# Patient Record
Sex: FEMALE | Race: BLACK OR AFRICAN AMERICAN | NOT HISPANIC OR LATINO | Employment: OTHER | URBAN - METROPOLITAN AREA
[De-identification: names, ages, dates, MRNs, and addresses within clinical notes are randomized per-mention and may not be internally consistent; named-entity substitution may affect disease eponyms.]

---

## 2017-03-09 PROBLEM — R93.89 ABNORMAL CHEST X-RAY: Status: ACTIVE | Noted: 2017-03-09

## 2017-03-09 PROBLEM — I20.89 STABLE ANGINA: Status: ACTIVE | Noted: 2017-03-09

## 2017-03-09 PROBLEM — I50.30 NYHA CLASS 3 HEART FAILURE WITH PRESERVED EJECTION FRACTION: Status: ACTIVE | Noted: 2017-03-09

## 2017-04-07 PROBLEM — R41.3 MEMORY LOSS: Chronic | Status: ACTIVE | Noted: 2017-04-07

## 2017-09-11 PROBLEM — I07.1 MILD TRICUSPID REGURGITATION: Status: ACTIVE | Noted: 2017-09-11

## 2017-09-11 PROBLEM — R05.8 ACE-INHIBITOR COUGH: Status: ACTIVE | Noted: 2017-09-11

## 2017-09-11 PROBLEM — Z87.891 HX OF TOBACCO USE, PRESENTING HAZARDS TO HEALTH: Status: ACTIVE | Noted: 2017-09-11

## 2017-09-11 PROBLEM — I50.30 NYHA CLASS 3 HEART FAILURE WITH PRESERVED EJECTION FRACTION: Status: RESOLVED | Noted: 2017-03-09 | Resolved: 2017-09-11

## 2017-09-11 PROBLEM — I51.7 MILD PULMONIC REGURGITATION AND RIGHT VENTRICULAR DILATION BY PRIOR ECHOCARDIOGRAM: Status: ACTIVE | Noted: 2017-09-11

## 2017-09-11 PROBLEM — I37.1 MILD PULMONIC REGURGITATION AND RIGHT VENTRICULAR DILATION BY PRIOR ECHOCARDIOGRAM: Status: ACTIVE | Noted: 2017-09-11

## 2017-09-11 PROBLEM — T46.4X5A ACE-INHIBITOR COUGH: Status: ACTIVE | Noted: 2017-09-11

## 2017-10-16 PROBLEM — J41.1 BRONCHITIS, MUCOPURULENT RECURRENT: Status: ACTIVE | Noted: 2017-10-16

## 2017-10-16 PROBLEM — Z98.890 HISTORY OF BRONCHOSCOPY: Status: ACTIVE | Noted: 2017-10-16

## 2017-10-16 PROBLEM — R05.3 PERSISTENT COUGH: Status: ACTIVE | Noted: 2017-10-16

## 2018-04-04 PROBLEM — R05.3 CHRONIC COUGH: Status: ACTIVE | Noted: 2018-04-04

## 2018-05-09 PROBLEM — Z86.010 HISTORY OF COLON POLYPS: Status: ACTIVE | Noted: 2018-05-09

## 2018-05-09 PROBLEM — R12 HEARTBURN: Status: ACTIVE | Noted: 2018-05-09

## 2018-05-09 PROBLEM — Z80.0 FAMILY HISTORY OF COLON CANCER: Status: ACTIVE | Noted: 2018-05-09

## 2018-05-14 PROBLEM — K02.9 DENTAL DECAY: Status: ACTIVE | Noted: 2018-05-14

## 2018-05-14 PROBLEM — Z63.8 CAREGIVER ROLE STRAIN: Status: ACTIVE | Noted: 2018-05-14

## 2018-07-23 PROBLEM — I20.89 STABLE ANGINA: Chronic | Status: ACTIVE | Noted: 2017-03-09

## 2018-07-23 PROBLEM — R41.3 MEMORY LOSS: Status: ACTIVE | Noted: 2017-04-07

## 2018-07-23 PROBLEM — Z63.8 CAREGIVER ROLE STRAIN: Chronic | Status: ACTIVE | Noted: 2018-05-14

## 2018-09-03 PROBLEM — J47.9 BRONCHIECTASIS: Status: ACTIVE | Noted: 2018-09-03

## 2018-10-03 PROBLEM — R39.15 URGENCY OF URINATION: Status: ACTIVE | Noted: 2018-10-03

## 2018-10-03 PROBLEM — N39.0 RECURRENT UTI: Status: ACTIVE | Noted: 2018-10-03

## 2018-10-03 PROBLEM — R35.0 URINARY FREQUENCY: Status: ACTIVE | Noted: 2018-10-03

## 2018-12-14 PROBLEM — J18.9 PNEUMONIA OF LEFT LOWER LOBE DUE TO INFECTIOUS ORGANISM: Status: ACTIVE | Noted: 2018-12-14

## 2019-05-30 PROBLEM — J44.1 COPD EXACERBATION: Status: ACTIVE | Noted: 2019-05-30

## 2019-06-08 PROBLEM — J44.9 COPD (CHRONIC OBSTRUCTIVE PULMONARY DISEASE): Status: ACTIVE | Noted: 2019-01-01

## 2019-06-08 PROBLEM — R07.89 LEFT-SIDED CHEST WALL PAIN: Status: ACTIVE | Noted: 2019-01-01

## 2019-06-08 PROBLEM — R79.89 ELEVATED TROPONIN: Status: ACTIVE | Noted: 2019-01-01

## 2019-07-25 PROBLEM — M25.512 SHOULDER PAIN, LEFT: Status: ACTIVE | Noted: 2019-01-01

## 2019-10-24 PROBLEM — I71.40 ABDOMINAL AORTIC ANEURYSM (AAA) WITHOUT RUPTURE: Status: ACTIVE | Noted: 2019-01-01

## 2019-11-13 PROBLEM — R79.89 ELEVATED TROPONIN: Status: RESOLVED | Noted: 2019-01-01 | Resolved: 2019-01-01

## 2019-11-13 PROBLEM — N18.31 CHRONIC KIDNEY DISEASE, STAGE 3A: Status: ACTIVE | Noted: 2019-01-01

## 2019-11-13 PROBLEM — N13.39 OTHER HYDRONEPHROSIS: Status: ACTIVE | Noted: 2019-01-01

## 2019-11-13 PROBLEM — N13.30 HYDRONEPHROSIS: Status: ACTIVE | Noted: 2019-01-01

## 2019-11-29 PROBLEM — I25.10 CAD (CORONARY ARTERY DISEASE): Status: ACTIVE | Noted: 2019-01-01

## 2019-11-29 PROBLEM — N12 PYELONEPHRITIS: Status: ACTIVE | Noted: 2019-01-01

## 2019-11-29 PROBLEM — I21.4 NSTEMI (NON-ST ELEVATED MYOCARDIAL INFARCTION): Status: ACTIVE | Noted: 2019-01-01

## 2019-11-29 PROBLEM — N10 ACUTE PYELONEPHRITIS: Status: ACTIVE | Noted: 2019-01-01

## 2019-12-01 PROBLEM — N30.90 CYSTITIS: Status: ACTIVE | Noted: 2019-01-01

## 2019-12-02 PROBLEM — I21.4 NSTEMI (NON-ST ELEVATED MYOCARDIAL INFARCTION): Status: RESOLVED | Noted: 2019-01-01 | Resolved: 2019-01-01

## 2019-12-02 PROBLEM — N30.90 CYSTITIS: Status: RESOLVED | Noted: 2019-01-01 | Resolved: 2019-01-01

## 2019-12-06 PROBLEM — D50.9 MICROCYTIC ANEMIA: Status: ACTIVE | Noted: 2019-01-01

## 2019-12-06 PROBLEM — R10.9 LEFT FLANK PAIN: Status: ACTIVE | Noted: 2019-01-01

## 2019-12-06 PROBLEM — N39.0 COMPLICATED URINARY TRACT INFECTION: Status: ACTIVE | Noted: 2019-01-01

## 2019-12-06 PROBLEM — N39.0 COMPLICATED UTI (URINARY TRACT INFECTION): Status: ACTIVE | Noted: 2019-01-01

## 2020-01-01 ENCOUNTER — HOSPITAL ENCOUNTER (INPATIENT)
Facility: HOSPITAL | Age: 75
LOS: 3 days | DRG: 441 | End: 2020-05-31
Attending: HOSPITALIST | Admitting: HOSPITALIST
Payer: MEDICARE

## 2020-01-01 VITALS
OXYGEN SATURATION: 87 % | HEART RATE: 79 BPM | RESPIRATION RATE: 42 BRPM | WEIGHT: 130 LBS | SYSTOLIC BLOOD PRESSURE: 75 MMHG | DIASTOLIC BLOOD PRESSURE: 54 MMHG | HEIGHT: 60 IN | TEMPERATURE: 97 F | BODY MASS INDEX: 25.52 KG/M2

## 2020-01-01 DIAGNOSIS — K76.9 ACUTE LIVER DISEASE: ICD-10-CM

## 2020-01-01 LAB
ABO + RH BLD: NORMAL
AFP SERPL-MCNC: 4.3 NG/ML (ref 0–8.4)
ALBUMIN SERPL BCP-MCNC: 2.2 G/DL (ref 3.5–5.2)
ALBUMIN SERPL BCP-MCNC: 2.4 G/DL (ref 3.5–5.2)
ALBUMIN SERPL BCP-MCNC: 2.5 G/DL (ref 3.5–5.2)
ALLENS TEST: ABNORMAL
ALP SERPL-CCNC: 343 U/L (ref 55–135)
ALP SERPL-CCNC: 375 U/L (ref 55–135)
ALP SERPL-CCNC: 376 U/L (ref 55–135)
ALP SERPL-CCNC: 382 U/L (ref 55–135)
ALP SERPL-CCNC: 385 U/L (ref 55–135)
ALP SERPL-CCNC: 388 U/L (ref 55–135)
ALP SERPL-CCNC: 391 U/L (ref 55–135)
ALP SERPL-CCNC: 393 U/L (ref 55–135)
ALP SERPL-CCNC: 399 U/L (ref 55–135)
ALP SERPL-CCNC: 407 U/L (ref 55–135)
ALT SERPL W/O P-5'-P-CCNC: 1170 U/L (ref 10–44)
ALT SERPL W/O P-5'-P-CCNC: 1343 U/L (ref 10–44)
ALT SERPL W/O P-5'-P-CCNC: 1498 U/L (ref 10–44)
ALT SERPL W/O P-5'-P-CCNC: 1546 U/L (ref 10–44)
ALT SERPL W/O P-5'-P-CCNC: 1617 U/L (ref 10–44)
ALT SERPL W/O P-5'-P-CCNC: 1641 U/L (ref 10–44)
ALT SERPL W/O P-5'-P-CCNC: 1651 U/L (ref 10–44)
ALT SERPL W/O P-5'-P-CCNC: 1659 U/L (ref 10–44)
ALT SERPL W/O P-5'-P-CCNC: 1680 U/L (ref 10–44)
ALT SERPL W/O P-5'-P-CCNC: 1683 U/L (ref 10–44)
AMMONIA PLAS-SCNC: 60 UMOL/L (ref 10–50)
AMMONIA PLAS-SCNC: 78 UMOL/L (ref 10–50)
AMORPH CRY UR QL COMP ASSIST: ABNORMAL
AMPHET+METHAMPHET UR QL: NEGATIVE
ANA PATTERN 1: NORMAL
ANA SER QL IF: POSITIVE
ANA TITR SER IF: NORMAL {TITER}
ANION GAP SERPL CALC-SCNC: 12 MMOL/L (ref 8–16)
ANION GAP SERPL CALC-SCNC: 12 MMOL/L (ref 8–16)
ANION GAP SERPL CALC-SCNC: 15 MMOL/L (ref 8–16)
ANION GAP SERPL CALC-SCNC: 17 MMOL/L (ref 8–16)
ANION GAP SERPL CALC-SCNC: 18 MMOL/L (ref 8–16)
ANION GAP SERPL CALC-SCNC: 21 MMOL/L (ref 8–16)
ANION GAP SERPL CALC-SCNC: 24 MMOL/L (ref 8–16)
ANISOCYTOSIS BLD QL SMEAR: ABNORMAL
ANISOCYTOSIS BLD QL SMEAR: SLIGHT
APAP SERPL-MCNC: <3 UG/ML (ref 10–20)
APTT BLDCRRT: 29.3 SEC (ref 21–32)
ASCENDING AORTA: 3.35 CM
AST SERPL-CCNC: 1644 U/L (ref 10–40)
AST SERPL-CCNC: 1865 U/L (ref 10–40)
AST SERPL-CCNC: 1867 U/L (ref 10–40)
AST SERPL-CCNC: 2139 U/L (ref 10–40)
AST SERPL-CCNC: 2215 U/L (ref 10–40)
AST SERPL-CCNC: 2250 U/L (ref 10–40)
AST SERPL-CCNC: 2259 U/L (ref 10–40)
AST SERPL-CCNC: 2278 U/L (ref 10–40)
AST SERPL-CCNC: 2326 U/L (ref 10–40)
AST SERPL-CCNC: 2349 U/L (ref 10–40)
AV INDEX (PROSTH): 1.15
AV MEAN GRADIENT: 1 MMHG
AV PEAK GRADIENT: 2 MMHG
AV VALVE AREA: 3.31 CM2
AV VELOCITY RATIO: 0.92
BACTERIA #/AREA URNS AUTO: ABNORMAL /HPF
BARBITURATES UR QL SCN>200 NG/ML: NEGATIVE
BASO STIPL BLD QL SMEAR: ABNORMAL
BASOPHILS # BLD AUTO: 0.01 K/UL (ref 0–0.2)
BASOPHILS # BLD AUTO: 0.02 K/UL (ref 0–0.2)
BASOPHILS # BLD AUTO: 0.02 K/UL (ref 0–0.2)
BASOPHILS NFR BLD: 0.1 % (ref 0–1.9)
BENZODIAZ UR QL SCN>200 NG/ML: NEGATIVE
BILIRUB DIRECT SERPL-MCNC: 1 MG/DL (ref 0.1–0.3)
BILIRUB SERPL-MCNC: 1.5 MG/DL (ref 0.1–1)
BILIRUB SERPL-MCNC: 1.7 MG/DL (ref 0.1–1)
BILIRUB SERPL-MCNC: 1.7 MG/DL (ref 0.1–1)
BILIRUB SERPL-MCNC: 1.8 MG/DL (ref 0.1–1)
BILIRUB SERPL-MCNC: 1.9 MG/DL (ref 0.1–1)
BILIRUB SERPL-MCNC: 1.9 MG/DL (ref 0.1–1)
BILIRUB SERPL-MCNC: 2 MG/DL (ref 0.1–1)
BILIRUB SERPL-MCNC: 2.1 MG/DL (ref 0.1–1)
BILIRUB UR QL STRIP: NEGATIVE
BLD GP AB SCN CELLS X3 SERPL QL: NORMAL
BSA FOR ECHO PROCEDURE: 1.58 M2
BUN SERPL-MCNC: 42 MG/DL (ref 8–23)
BUN SERPL-MCNC: 43 MG/DL (ref 8–23)
BUN SERPL-MCNC: 46 MG/DL (ref 8–23)
BUN SERPL-MCNC: 47 MG/DL (ref 8–23)
BUN SERPL-MCNC: 47 MG/DL (ref 8–23)
BUN SERPL-MCNC: 48 MG/DL (ref 8–23)
BUN SERPL-MCNC: 49 MG/DL (ref 8–23)
BUN SERPL-MCNC: 49 MG/DL (ref 8–23)
BUN SERPL-MCNC: 50 MG/DL (ref 8–23)
BUN SERPL-MCNC: 53 MG/DL (ref 8–23)
BUN SERPL-MCNC: 53 MG/DL (ref 8–23)
BURR CELLS BLD QL SMEAR: ABNORMAL
BURR CELLS BLD QL SMEAR: ABNORMAL
BZE UR QL SCN: NEGATIVE
CALCIUM SERPL-MCNC: 7.8 MG/DL (ref 8.7–10.5)
CALCIUM SERPL-MCNC: 8.4 MG/DL (ref 8.7–10.5)
CALCIUM SERPL-MCNC: 8.5 MG/DL (ref 8.7–10.5)
CALCIUM SERPL-MCNC: 8.7 MG/DL (ref 8.7–10.5)
CALCIUM SERPL-MCNC: 8.9 MG/DL (ref 8.7–10.5)
CANCER AG125 SERPL-ACNC: 157 U/ML (ref 0–30)
CANCER AG19-9 SERPL-ACNC: <2 U/ML (ref 2–40)
CANNABINOIDS UR QL SCN: NEGATIVE
CEA SERPL-MCNC: 64.3 NG/ML (ref 0–5)
CHLORIDE SERPL-SCNC: 103 MMOL/L (ref 95–110)
CHLORIDE SERPL-SCNC: 104 MMOL/L (ref 95–110)
CHLORIDE SERPL-SCNC: 105 MMOL/L (ref 95–110)
CHLORIDE SERPL-SCNC: 110 MMOL/L (ref 95–110)
CHOLEST SERPL-MCNC: 82 MG/DL (ref 120–199)
CHOLEST/HDLC SERPL: 4.1 {RATIO} (ref 2–5)
CK SERPL-CCNC: 175 U/L (ref 20–180)
CLARITY UR REFRACT.AUTO: ABNORMAL
CMV IGG SERPL QL IA: REACTIVE
CO2 SERPL-SCNC: 10 MMOL/L (ref 23–29)
CO2 SERPL-SCNC: 10 MMOL/L (ref 23–29)
CO2 SERPL-SCNC: 11 MMOL/L (ref 23–29)
CO2 SERPL-SCNC: 12 MMOL/L (ref 23–29)
CO2 SERPL-SCNC: 13 MMOL/L (ref 23–29)
CO2 SERPL-SCNC: 15 MMOL/L (ref 23–29)
CO2 SERPL-SCNC: 5 MMOL/L (ref 23–29)
COLOR UR AUTO: YELLOW
CREAT SERPL-MCNC: 2.2 MG/DL (ref 0.5–1.4)
CREAT SERPL-MCNC: 2.4 MG/DL (ref 0.5–1.4)
CREAT SERPL-MCNC: 2.5 MG/DL (ref 0.5–1.4)
CREAT SERPL-MCNC: 2.6 MG/DL (ref 0.5–1.4)
CREAT SERPL-MCNC: 2.6 MG/DL (ref 0.5–1.4)
CREAT SERPL-MCNC: 2.7 MG/DL (ref 0.5–1.4)
CREAT SERPL-MCNC: 2.7 MG/DL (ref 0.5–1.4)
CREAT SERPL-MCNC: 2.8 MG/DL (ref 0.5–1.4)
CREAT SERPL-MCNC: 2.8 MG/DL (ref 0.5–1.4)
CREAT SERPL-MCNC: 2.9 MG/DL (ref 0.5–1.4)
CREAT SERPL-MCNC: 3.1 MG/DL (ref 0.5–1.4)
CREAT UR-MCNC: 76 MG/DL (ref 15–325)
CREAT UR-MCNC: 76 MG/DL (ref 15–325)
CV ECHO LV RWT: 0.44 CM
DELSYS: ABNORMAL
DIFFERENTIAL METHOD: ABNORMAL
DOP CALC AO PEAK VEL: 0.66 M/S
DOP CALC AO VTI: 7.92 CM
DOP CALC LVOT AREA: 2.9 CM2
DOP CALC LVOT DIAMETER: 1.92 CM
DOP CALC LVOT PEAK VEL: 0.61 M/S
DOP CALC LVOT STROKE VOLUME: 26.25 CM3
DOP CALCLVOT PEAK VEL VTI: 9.07 CM
ECHO LV POSTERIOR WALL: 0.76 CM (ref 0.6–1.1)
EOSINOPHIL # BLD AUTO: 0 K/UL (ref 0–0.5)
EOSINOPHIL # BLD AUTO: 0.1 K/UL (ref 0–0.5)
EOSINOPHIL NFR BLD: 0 % (ref 0–8)
EOSINOPHIL NFR BLD: 0.1 % (ref 0–8)
EOSINOPHIL NFR BLD: 0.2 % (ref 0–8)
EOSINOPHIL NFR BLD: 0.3 % (ref 0–8)
ERYTHROCYTE [DISTWIDTH] IN BLOOD BY AUTOMATED COUNT: 22.4 % (ref 11.5–14.5)
ERYTHROCYTE [DISTWIDTH] IN BLOOD BY AUTOMATED COUNT: 23.1 % (ref 11.5–14.5)
ERYTHROCYTE [DISTWIDTH] IN BLOOD BY AUTOMATED COUNT: 23.1 % (ref 11.5–14.5)
ERYTHROCYTE [DISTWIDTH] IN BLOOD BY AUTOMATED COUNT: 23.2 % (ref 11.5–14.5)
ERYTHROCYTE [DISTWIDTH] IN BLOOD BY AUTOMATED COUNT: 23.3 % (ref 11.5–14.5)
ERYTHROCYTE [DISTWIDTH] IN BLOOD BY AUTOMATED COUNT: 23.5 % (ref 11.5–14.5)
ERYTHROCYTE [SEDIMENTATION RATE] IN BLOOD BY WESTERGREN METHOD: 25 MM/H
ERYTHROCYTE [SEDIMENTATION RATE] IN BLOOD BY WESTERGREN METHOD: 30 MM/H
EST. GFR  (AFRICAN AMERICAN): 16.1 ML/MIN/1.73 M^2
EST. GFR  (AFRICAN AMERICAN): 17.5 ML/MIN/1.73 M^2
EST. GFR  (AFRICAN AMERICAN): 18.2 ML/MIN/1.73 M^2
EST. GFR  (AFRICAN AMERICAN): 18.2 ML/MIN/1.73 M^2
EST. GFR  (AFRICAN AMERICAN): 19 ML/MIN/1.73 M^2
EST. GFR  (AFRICAN AMERICAN): 19 ML/MIN/1.73 M^2
EST. GFR  (AFRICAN AMERICAN): 19.9 ML/MIN/1.73 M^2
EST. GFR  (AFRICAN AMERICAN): 19.9 ML/MIN/1.73 M^2
EST. GFR  (AFRICAN AMERICAN): 20.9 ML/MIN/1.73 M^2
EST. GFR  (AFRICAN AMERICAN): 21.9 ML/MIN/1.73 M^2
EST. GFR  (AFRICAN AMERICAN): 24.4 ML/MIN/1.73 M^2
EST. GFR  (NON AFRICAN AMERICAN): 14 ML/MIN/1.73 M^2
EST. GFR  (NON AFRICAN AMERICAN): 15.1 ML/MIN/1.73 M^2
EST. GFR  (NON AFRICAN AMERICAN): 15.8 ML/MIN/1.73 M^2
EST. GFR  (NON AFRICAN AMERICAN): 15.8 ML/MIN/1.73 M^2
EST. GFR  (NON AFRICAN AMERICAN): 16.5 ML/MIN/1.73 M^2
EST. GFR  (NON AFRICAN AMERICAN): 16.5 ML/MIN/1.73 M^2
EST. GFR  (NON AFRICAN AMERICAN): 17.3 ML/MIN/1.73 M^2
EST. GFR  (NON AFRICAN AMERICAN): 17.3 ML/MIN/1.73 M^2
EST. GFR  (NON AFRICAN AMERICAN): 18.1 ML/MIN/1.73 M^2
EST. GFR  (NON AFRICAN AMERICAN): 19 ML/MIN/1.73 M^2
EST. GFR  (NON AFRICAN AMERICAN): 21.1 ML/MIN/1.73 M^2
ESTIMATED AVG GLUCOSE: 120 MG/DL (ref 68–131)
ESTIMATED AVG GLUCOSE: 120 MG/DL (ref 68–131)
ETHANOL UR-MCNC: <10 MG/DL
ETHANOL UR-MCNC: <10 MG/DL
FERRITIN SERPL-MCNC: 147 NG/ML (ref 20–300)
FERRITIN SERPL-MCNC: 155 NG/ML (ref 20–300)
FIO2: 80
FLOW: 2
FOLATE SERPL-MCNC: 27.1 NG/ML (ref 4–24)
FRACTIONAL SHORTENING: 28 % (ref 28–44)
GLUCOSE SERPL-MCNC: 104 MG/DL (ref 70–110)
GLUCOSE SERPL-MCNC: 115 MG/DL (ref 70–110)
GLUCOSE SERPL-MCNC: 239 MG/DL (ref 70–110)
GLUCOSE SERPL-MCNC: 73 MG/DL (ref 70–110)
GLUCOSE SERPL-MCNC: 74 MG/DL (ref 70–110)
GLUCOSE SERPL-MCNC: 76 MG/DL (ref 70–110)
GLUCOSE SERPL-MCNC: 79 MG/DL (ref 70–110)
GLUCOSE SERPL-MCNC: 81 MG/DL (ref 70–110)
GLUCOSE SERPL-MCNC: 86 MG/DL (ref 70–110)
GLUCOSE SERPL-MCNC: 91 MG/DL (ref 70–110)
GLUCOSE SERPL-MCNC: 95 MG/DL (ref 70–110)
GLUCOSE UR QL STRIP: NEGATIVE
HAV IGM SERPL QL IA: NEGATIVE
HBA1C MFR BLD HPLC: 5.8 % (ref 4–5.6)
HBA1C MFR BLD HPLC: 5.8 % (ref 4–5.6)
HBV CORE IGM SERPL QL IA: NEGATIVE
HBV SURFACE AG SERPL QL IA: NEGATIVE
HCO3 UR-SCNC: 10 MMOL/L (ref 24–28)
HCO3 UR-SCNC: 10.8 MMOL/L (ref 24–28)
HCO3 UR-SCNC: 13.9 MMOL/L (ref 24–28)
HCO3 UR-SCNC: 14.1 MMOL/L (ref 24–28)
HCO3 UR-SCNC: 17.1 MMOL/L (ref 24–28)
HCO3 UR-SCNC: 7.5 MMOL/L (ref 24–28)
HCO3 UR-SCNC: 7.8 MMOL/L (ref 24–28)
HCT VFR BLD AUTO: 25.4 % (ref 37–48.5)
HCT VFR BLD AUTO: 26.8 % (ref 37–48.5)
HCT VFR BLD AUTO: 27.3 % (ref 37–48.5)
HCT VFR BLD AUTO: 27.8 % (ref 37–48.5)
HCT VFR BLD AUTO: 28.1 % (ref 37–48.5)
HCT VFR BLD AUTO: 28.3 % (ref 37–48.5)
HCT VFR BLD CALC: 34 %PCV (ref 36–54)
HCV AB SERPL QL IA: NEGATIVE
HDLC SERPL-MCNC: 20 MG/DL (ref 40–75)
HDLC SERPL: 24.4 % (ref 20–50)
HGB BLD-MCNC: 8.2 G/DL (ref 12–16)
HGB BLD-MCNC: 8.5 G/DL (ref 12–16)
HGB BLD-MCNC: 8.5 G/DL (ref 12–16)
HGB BLD-MCNC: 8.6 G/DL (ref 12–16)
HGB BLD-MCNC: 8.8 G/DL (ref 12–16)
HGB BLD-MCNC: 8.9 G/DL (ref 12–16)
HGB UR QL STRIP: ABNORMAL
HOWELL-JOLLY BOD BLD QL SMEAR: ABNORMAL
HYALINE CASTS UR QL AUTO: 29 /LPF
HYPOCHROMIA BLD QL SMEAR: ABNORMAL
HYPOCHROMIA BLD QL SMEAR: ABNORMAL
IGG SERPL-MCNC: 1061 MG/DL (ref 650–1600)
IGG SERPL-MCNC: 1076 MG/DL (ref 650–1600)
IMM GRANULOCYTES # BLD AUTO: 0.12 K/UL (ref 0–0.04)
IMM GRANULOCYTES # BLD AUTO: 0.13 K/UL (ref 0–0.04)
IMM GRANULOCYTES # BLD AUTO: 0.13 K/UL (ref 0–0.04)
IMM GRANULOCYTES # BLD AUTO: 0.15 K/UL (ref 0–0.04)
IMM GRANULOCYTES # BLD AUTO: 0.16 K/UL (ref 0–0.04)
IMM GRANULOCYTES # BLD AUTO: 0.16 K/UL (ref 0–0.04)
IMM GRANULOCYTES NFR BLD AUTO: 0.8 % (ref 0–0.5)
IMM GRANULOCYTES NFR BLD AUTO: 1 % (ref 0–0.5)
IMM GRANULOCYTES NFR BLD AUTO: 1 % (ref 0–0.5)
IMM GRANULOCYTES NFR BLD AUTO: 1.1 % (ref 0–0.5)
INR PPP: 3 (ref 0.8–1.2)
INR PPP: 3 (ref 0.8–1.2)
INR PPP: 3.1 (ref 0.8–1.2)
INR PPP: 3.3 (ref 0.8–1.2)
INR PPP: 3.5 (ref 0.8–1.2)
INTERVENTRICULAR SEPTUM: 0.96 CM (ref 0.6–1.1)
IRON SERPL-MCNC: 16 UG/DL (ref 30–160)
KETONES UR QL STRIP: NEGATIVE
LA MAJOR: 4.48 CM
LA MINOR: 4.4 CM
LA WIDTH: 3.38 CM
LACTATE SERPL-SCNC: 4.4 MMOL/L (ref 0.5–2.2)
LACTATE SERPL-SCNC: 4.5 MMOL/L (ref 0.5–2.2)
LACTATE SERPL-SCNC: 4.5 MMOL/L (ref 0.5–2.2)
LACTATE SERPL-SCNC: 4.7 MMOL/L (ref 0.5–2.2)
LACTATE SERPL-SCNC: 4.9 MMOL/L (ref 0.5–2.2)
LACTATE SERPL-SCNC: 5.1 MMOL/L (ref 0.5–2.2)
LACTATE SERPL-SCNC: 5.2 MMOL/L (ref 0.5–2.2)
LACTATE SERPL-SCNC: 5.4 MMOL/L (ref 0.5–2.2)
LACTATE SERPL-SCNC: 5.5 MMOL/L (ref 0.5–2.2)
LACTATE SERPL-SCNC: 6.2 MMOL/L (ref 0.5–2.2)
LACTATE SERPL-SCNC: 6.3 MMOL/L (ref 0.5–2.2)
LACTATE SERPL-SCNC: 6.9 MMOL/L (ref 0.5–2.2)
LACTATE SERPL-SCNC: 7.6 MMOL/L (ref 0.5–2.2)
LACTATE SERPL-SCNC: 7.7 MMOL/L (ref 0.5–2.2)
LACTATE SERPL-SCNC: >12 MMOL/L (ref 0.5–2.2)
LACTATE SERPL-SCNC: >12 MMOL/L (ref 0.5–2.2)
LDLC SERPL CALC-MCNC: 47.2 MG/DL (ref 63–159)
LEFT ATRIUM SIZE: 3.09 CM
LEFT ATRIUM VOLUME INDEX: 25.4 ML/M2
LEFT ATRIUM VOLUME: 39.41 CM3
LEFT INTERNAL DIMENSION IN SYSTOLE: 2.51 CM (ref 2.1–4)
LEFT VENTRICLE DIASTOLIC VOLUME INDEX: 32.31 ML/M2
LEFT VENTRICLE DIASTOLIC VOLUME: 50.22 ML
LEFT VENTRICLE MASS INDEX: 53 G/M2
LEFT VENTRICLE SYSTOLIC VOLUME INDEX: 14.6 ML/M2
LEFT VENTRICLE SYSTOLIC VOLUME: 22.65 ML
LEFT VENTRICULAR INTERNAL DIMENSION IN DIASTOLE: 3.48 CM (ref 3.5–6)
LEFT VENTRICULAR MASS: 82.52 G
LEUKOCYTE ESTERASE UR QL STRIP: ABNORMAL
LYMPHOCYTES # BLD AUTO: 0 K/UL (ref 1–4.8)
LYMPHOCYTES # BLD AUTO: 0.1 K/UL (ref 1–4.8)
LYMPHOCYTES NFR BLD: 0 % (ref 18–48)
LYMPHOCYTES NFR BLD: 0.5 % (ref 18–48)
LYMPHOCYTES NFR BLD: 0.7 % (ref 18–48)
LYMPHOCYTES NFR BLD: 0.7 % (ref 18–48)
MAGNESIUM SERPL-MCNC: 1.7 MG/DL (ref 1.6–2.6)
MAGNESIUM SERPL-MCNC: 1.8 MG/DL (ref 1.6–2.6)
MCH RBC QN AUTO: 22.7 PG (ref 27–31)
MCH RBC QN AUTO: 23 PG (ref 27–31)
MCH RBC QN AUTO: 23.1 PG (ref 27–31)
MCH RBC QN AUTO: 23.1 PG (ref 27–31)
MCH RBC QN AUTO: 23.2 PG (ref 27–31)
MCH RBC QN AUTO: 23.7 PG (ref 27–31)
MCHC RBC AUTO-ENTMCNC: 30.6 G/DL (ref 32–36)
MCHC RBC AUTO-ENTMCNC: 31.3 G/DL (ref 32–36)
MCHC RBC AUTO-ENTMCNC: 31.4 G/DL (ref 32–36)
MCHC RBC AUTO-ENTMCNC: 31.5 G/DL (ref 32–36)
MCHC RBC AUTO-ENTMCNC: 31.7 G/DL (ref 32–36)
MCHC RBC AUTO-ENTMCNC: 32.3 G/DL (ref 32–36)
MCV RBC AUTO: 72 FL (ref 82–98)
MCV RBC AUTO: 73 FL (ref 82–98)
MCV RBC AUTO: 73 FL (ref 82–98)
MCV RBC AUTO: 74 FL (ref 82–98)
MCV RBC AUTO: 74 FL (ref 82–98)
MCV RBC AUTO: 75 FL (ref 82–98)
METHADONE UR QL SCN>300 NG/ML: NEGATIVE
MICROSCOPIC COMMENT: ABNORMAL
MITOCHONDRIA AB TITR SER IF: NORMAL {TITER}
MODE: ABNORMAL
MONOCYTES # BLD AUTO: 0.7 K/UL (ref 0.3–1)
MONOCYTES # BLD AUTO: 0.8 K/UL (ref 0.3–1)
MONOCYTES # BLD AUTO: 0.8 K/UL (ref 0.3–1)
MONOCYTES # BLD AUTO: 0.9 K/UL (ref 0.3–1)
MONOCYTES # BLD AUTO: 1 K/UL (ref 0.3–1)
MONOCYTES # BLD AUTO: 1 K/UL (ref 0.3–1)
MONOCYTES NFR BLD: 4.4 % (ref 4–15)
MONOCYTES NFR BLD: 4.8 % (ref 4–15)
MONOCYTES NFR BLD: 4.9 % (ref 4–15)
MONOCYTES NFR BLD: 5.5 % (ref 4–15)
MONOCYTES NFR BLD: 6.9 % (ref 4–15)
MONOCYTES NFR BLD: 7.2 % (ref 4–15)
NEUTROPHILS # BLD AUTO: 13.2 K/UL (ref 1.8–7.7)
NEUTROPHILS # BLD AUTO: 13.3 K/UL (ref 1.8–7.7)
NEUTROPHILS # BLD AUTO: 14.4 K/UL (ref 1.8–7.7)
NEUTROPHILS # BLD AUTO: 14.8 K/UL (ref 1.8–7.7)
NEUTROPHILS # BLD AUTO: 15.1 K/UL (ref 1.8–7.7)
NEUTROPHILS # BLD AUTO: 15.3 K/UL (ref 1.8–7.7)
NEUTROPHILS NFR BLD: 91.2 % (ref 38–73)
NEUTROPHILS NFR BLD: 91.2 % (ref 38–73)
NEUTROPHILS NFR BLD: 93.6 % (ref 38–73)
NEUTROPHILS NFR BLD: 93.8 % (ref 38–73)
NEUTROPHILS NFR BLD: 93.9 % (ref 38–73)
NEUTROPHILS NFR BLD: 94 % (ref 38–73)
NITRITE UR QL STRIP: NEGATIVE
NONHDLC SERPL-MCNC: 62 MG/DL
NRBC BLD-RTO: 12 /100 WBC
NRBC BLD-RTO: 14 /100 WBC
NRBC BLD-RTO: 15 /100 WBC
NRBC BLD-RTO: 15 /100 WBC
OPIATES UR QL SCN: NORMAL
OVALOCYTES BLD QL SMEAR: ABNORMAL
PCO2 BLDA: 23.3 MMHG (ref 35–45)
PCO2 BLDA: 29 MMHG (ref 35–45)
PCO2 BLDA: 29.7 MMHG (ref 35–45)
PCO2 BLDA: 31.3 MMHG (ref 35–45)
PCO2 BLDA: 31.4 MMHG (ref 35–45)
PCO2 BLDA: 34.5 MMHG (ref 35–45)
PCO2 BLDA: 37.7 MMHG (ref 35–45)
PCP UR QL SCN>25 NG/ML: NEGATIVE
PEEP: 10
PEEP: 5
PH SMN: 6.94 [PH] (ref 7.35–7.45)
PH SMN: 6.97 [PH] (ref 7.35–7.45)
PH SMN: 7 [PH] (ref 7.35–7.45)
PH SMN: 7.13 [PH] (ref 7.35–7.45)
PH SMN: 7.26 [PH] (ref 7.35–7.45)
PH SMN: 7.26 [PH] (ref 7.35–7.45)
PH SMN: 7.28 [PH] (ref 7.35–7.45)
PH SMN: 7.29 [PH] (ref 7.35–7.45)
PH UR STRIP: 5 [PH] (ref 5–8)
PHOSPHATE SERPL-MCNC: 3 MG/DL (ref 2.7–4.5)
PHOSPHATE SERPL-MCNC: 4.3 MG/DL (ref 2.7–4.5)
PHOSPHATE SERPL-MCNC: 4.6 MG/DL (ref 2.7–4.5)
PISA TR MAX VEL: 2.81 M/S
PLATELET # BLD AUTO: 113 K/UL (ref 150–350)
PLATELET # BLD AUTO: 83 K/UL (ref 150–350)
PLATELET # BLD AUTO: 91 K/UL (ref 150–350)
PLATELET # BLD AUTO: 93 K/UL (ref 150–350)
PLATELET # BLD AUTO: 95 K/UL (ref 150–350)
PLATELET # BLD AUTO: 99 K/UL (ref 150–350)
PLATELET BLD QL SMEAR: ABNORMAL
PMV BLD AUTO: ABNORMAL FL (ref 9.2–12.9)
PO2 BLDA: 25 MMHG (ref 40–60)
PO2 BLDA: 25 MMHG (ref 40–60)
PO2 BLDA: 326 MMHG (ref 80–100)
PO2 BLDA: 39 MMHG (ref 40–60)
PO2 BLDA: 42 MMHG (ref 40–60)
PO2 BLDA: 78 MMHG (ref 40–60)
PO2 BLDA: 99 MMHG (ref 80–100)
POC BE: -10 MMOL/L
POC BE: -13 MMOL/L
POC BE: -13 MMOL/L
POC BE: -16 MMOL/L
POC BE: -19 MMOL/L
POC BE: -23 MMOL/L
POC BE: -25 MMOL/L
POC IONIZED CALCIUM: 1.22 MMOL/L (ref 1.06–1.42)
POC SATURATED O2: 100 % (ref 95–100)
POC SATURATED O2: 38 % (ref 95–100)
POC SATURATED O2: 38 % (ref 95–100)
POC SATURATED O2: 69 % (ref 95–100)
POC SATURATED O2: 73 % (ref 95–100)
POC SATURATED O2: 85 % (ref 95–100)
POC SATURATED O2: 95 % (ref 95–100)
POC TCO2: 11 MMOL/L (ref 23–27)
POC TCO2: 12 MMOL/L (ref 24–29)
POC TCO2: 15 MMOL/L (ref 24–29)
POC TCO2: 15 MMOL/L (ref 24–29)
POC TCO2: 18 MMOL/L (ref 24–29)
POC TCO2: 8 MMOL/L (ref 24–29)
POC TCO2: 9 MMOL/L (ref 23–27)
POCT GLUCOSE: 114 MG/DL (ref 70–110)
POCT GLUCOSE: 118 MG/DL (ref 70–110)
POCT GLUCOSE: 128 MG/DL (ref 70–110)
POCT GLUCOSE: 171 MG/DL (ref 70–110)
POCT GLUCOSE: 61 MG/DL (ref 70–110)
POCT GLUCOSE: 82 MG/DL (ref 70–110)
POCT GLUCOSE: 85 MG/DL (ref 70–110)
POCT GLUCOSE: 87 MG/DL (ref 70–110)
POCT GLUCOSE: 89 MG/DL (ref 70–110)
POCT GLUCOSE: 90 MG/DL (ref 70–110)
POCT GLUCOSE: 91 MG/DL (ref 70–110)
POCT GLUCOSE: 97 MG/DL (ref 70–110)
POIKILOCYTOSIS BLD QL SMEAR: ABNORMAL
POIKILOCYTOSIS BLD QL SMEAR: SLIGHT
POLYCHROMASIA BLD QL SMEAR: ABNORMAL
POLYCHROMASIA BLD QL SMEAR: ABNORMAL
POTASSIUM BLD-SCNC: 4.2 MMOL/L (ref 3.5–5.1)
POTASSIUM SERPL-SCNC: 3.6 MMOL/L (ref 3.5–5.1)
POTASSIUM SERPL-SCNC: 3.7 MMOL/L (ref 3.5–5.1)
POTASSIUM SERPL-SCNC: 3.8 MMOL/L (ref 3.5–5.1)
POTASSIUM SERPL-SCNC: 3.8 MMOL/L (ref 3.5–5.1)
POTASSIUM SERPL-SCNC: 3.9 MMOL/L (ref 3.5–5.1)
POTASSIUM SERPL-SCNC: 4.2 MMOL/L (ref 3.5–5.1)
POTASSIUM SERPL-SCNC: 4.4 MMOL/L (ref 3.5–5.1)
POTASSIUM SERPL-SCNC: 4.5 MMOL/L (ref 3.5–5.1)
PROT SERPL-MCNC: 5.3 G/DL (ref 6–8.4)
PROT SERPL-MCNC: 5.4 G/DL (ref 6–8.4)
PROT SERPL-MCNC: 5.6 G/DL (ref 6–8.4)
PROT SERPL-MCNC: 5.6 G/DL (ref 6–8.4)
PROT SERPL-MCNC: 5.7 G/DL (ref 6–8.4)
PROT SERPL-MCNC: 5.8 G/DL (ref 6–8.4)
PROT SERPL-MCNC: 6 G/DL (ref 6–8.4)
PROT UR QL STRIP: ABNORMAL
PROTHROMBIN TIME: 28.2 SEC (ref 9–12.5)
PROTHROMBIN TIME: 28.3 SEC (ref 9–12.5)
PROTHROMBIN TIME: 28.9 SEC (ref 9–12.5)
PROTHROMBIN TIME: 31.2 SEC (ref 9–12.5)
PROTHROMBIN TIME: 33.1 SEC (ref 9–12.5)
RA MAJOR: 4.11 CM
RA PRESSURE: 15 MMHG
RA WIDTH: 3.29 CM
RBC # BLD AUTO: 3.55 M/UL (ref 4–5.4)
RBC # BLD AUTO: 3.63 M/UL (ref 4–5.4)
RBC # BLD AUTO: 3.67 M/UL (ref 4–5.4)
RBC # BLD AUTO: 3.75 M/UL (ref 4–5.4)
RBC # BLD AUTO: 3.82 M/UL (ref 4–5.4)
RBC # BLD AUTO: 3.86 M/UL (ref 4–5.4)
RBC #/AREA URNS AUTO: 2 /HPF (ref 0–4)
RIGHT VENTRICULAR END-DIASTOLIC DIMENSION: 4.5 CM
SAMPLE: ABNORMAL
SATURATED IRON: 4 % (ref 20–50)
SCHISTOCYTES BLD QL SMEAR: ABNORMAL
SCHISTOCYTES BLD QL SMEAR: ABNORMAL
SCHISTOCYTES BLD QL SMEAR: PRESENT
SINUS: 2.93 CM
SITE: ABNORMAL
SMOOTH MUSCLE AB TITR SER IF: NORMAL {TITER}
SODIUM BLD-SCNC: 134 MMOL/L (ref 136–145)
SODIUM SERPL-SCNC: 129 MMOL/L (ref 136–145)
SODIUM SERPL-SCNC: 130 MMOL/L (ref 136–145)
SODIUM SERPL-SCNC: 132 MMOL/L (ref 136–145)
SODIUM SERPL-SCNC: 133 MMOL/L (ref 136–145)
SODIUM SERPL-SCNC: 133 MMOL/L (ref 136–145)
SODIUM SERPL-SCNC: 134 MMOL/L (ref 136–145)
SODIUM SERPL-SCNC: 135 MMOL/L (ref 136–145)
SODIUM SERPL-SCNC: 136 MMOL/L (ref 136–145)
SODIUM UR-SCNC: <20 MMOL/L (ref 20–250)
SP GR UR STRIP: 1.01 (ref 1–1.03)
SQUAMOUS #/AREA URNS AUTO: 1 /HPF
STJ: 2.97 CM
T4 FREE SERPL-MCNC: 0.72 NG/DL (ref 0.71–1.51)
TARGETS BLD QL SMEAR: ABNORMAL
TARGETS BLD QL SMEAR: ABNORMAL
TDI LATERAL: 0.06 M/S
TDI SEPTAL: 0.07 M/S
TDI: 0.07 M/S
TOTAL IRON BINDING CAPACITY: 406 UG/DL (ref 250–450)
TOXICOLOGY INFORMATION: NORMAL
TR MAX PG: 32 MMHG
TRANSFERRIN SERPL-MCNC: 274 MG/DL (ref 200–375)
TRICUSPID ANNULAR PLANE SYSTOLIC EXCURSION: 0.08 CM
TRIGL SERPL-MCNC: 74 MG/DL (ref 30–150)
TROPONIN I SERPL DL<=0.01 NG/ML-MCNC: 0.18 NG/ML (ref 0–0.03)
TROPONIN I SERPL DL<=0.01 NG/ML-MCNC: 0.19 NG/ML (ref 0–0.03)
TROPONIN I SERPL DL<=0.01 NG/ML-MCNC: 0.2 NG/ML (ref 0–0.03)
TROPONIN I SERPL DL<=0.01 NG/ML-MCNC: 0.21 NG/ML (ref 0–0.03)
TSH SERPL DL<=0.005 MIU/L-ACNC: 10.64 UIU/ML (ref 0.4–4)
TV REST PULMONARY ARTERY PRESSURE: 47 MMHG
URN SPEC COLLECT METH UR: ABNORMAL
UUN UR-MCNC: 516 MG/DL (ref 140–1050)
VANCOMYCIN SERPL-MCNC: 11.1 UG/ML
VANCOMYCIN SERPL-MCNC: 14.4 UG/ML
VANCOMYCIN SERPL-MCNC: 19 UG/ML
VIT B12 SERPL-MCNC: >2000 PG/ML (ref 210–950)
VT: 450
VT: 450
WBC # BLD AUTO: 14.46 K/UL (ref 3.9–12.7)
WBC # BLD AUTO: 14.59 K/UL (ref 3.9–12.7)
WBC # BLD AUTO: 15.36 K/UL (ref 3.9–12.7)
WBC # BLD AUTO: 15.77 K/UL (ref 3.9–12.7)
WBC # BLD AUTO: 16.03 K/UL (ref 3.9–12.7)
WBC # BLD AUTO: 16.3 K/UL (ref 3.9–12.7)
WBC #/AREA URNS AUTO: 9 /HPF (ref 0–5)

## 2020-01-01 PROCEDURE — 84295 ASSAY OF SERUM SODIUM: CPT

## 2020-01-01 PROCEDURE — 97535 SELF CARE MNGMENT TRAINING: CPT

## 2020-01-01 PROCEDURE — 82800 BLOOD PH: CPT

## 2020-01-01 PROCEDURE — 25000003 PHARM REV CODE 250: Performed by: STUDENT IN AN ORGANIZED HEALTH CARE EDUCATION/TRAINING PROGRAM

## 2020-01-01 PROCEDURE — 84300 ASSAY OF URINE SODIUM: CPT

## 2020-01-01 PROCEDURE — 27100108

## 2020-01-01 PROCEDURE — 83605 ASSAY OF LACTIC ACID: CPT | Mod: 91

## 2020-01-01 PROCEDURE — 82140 ASSAY OF AMMONIA: CPT

## 2020-01-01 PROCEDURE — 87799 DETECT AGENT NOS DNA QUANT: CPT

## 2020-01-01 PROCEDURE — 63600175 PHARM REV CODE 636 W HCPCS

## 2020-01-01 PROCEDURE — 63600175 PHARM REV CODE 636 W HCPCS: Performed by: STUDENT IN AN ORGANIZED HEALTH CARE EDUCATION/TRAINING PROGRAM

## 2020-01-01 PROCEDURE — 82803 BLOOD GASES ANY COMBINATION: CPT

## 2020-01-01 PROCEDURE — 31500 INSERT EMERGENCY AIRWAY: CPT

## 2020-01-01 PROCEDURE — 99291 PR CRITICAL CARE, E/M 30-74 MINUTES: ICD-10-PCS | Mod: ,,, | Performed by: EMERGENCY MEDICINE

## 2020-01-01 PROCEDURE — 80329 ANALGESICS NON-OPIOID 1 OR 2: CPT

## 2020-01-01 PROCEDURE — S0030 INJECTION, METRONIDAZOLE: HCPCS | Performed by: STUDENT IN AN ORGANIZED HEALTH CARE EDUCATION/TRAINING PROGRAM

## 2020-01-01 PROCEDURE — 84443 ASSAY THYROID STIM HORMONE: CPT | Mod: 91

## 2020-01-01 PROCEDURE — 80048 BASIC METABOLIC PNL TOTAL CA: CPT

## 2020-01-01 PROCEDURE — 87106 FUNGI IDENTIFICATION YEAST: CPT

## 2020-01-01 PROCEDURE — 84100 ASSAY OF PHOSPHORUS: CPT

## 2020-01-01 PROCEDURE — 85014 HEMATOCRIT: CPT

## 2020-01-01 PROCEDURE — 94640 AIRWAY INHALATION TREATMENT: CPT

## 2020-01-01 PROCEDURE — 85610 PROTHROMBIN TIME: CPT

## 2020-01-01 PROCEDURE — 92950 HEART/LUNG RESUSCITATION CPR: CPT

## 2020-01-01 PROCEDURE — 99291 CRITICAL CARE FIRST HOUR: CPT | Mod: 25,,, | Performed by: EMERGENCY MEDICINE

## 2020-01-01 PROCEDURE — 25000242 PHARM REV CODE 250 ALT 637 W/ HCPCS: Performed by: STUDENT IN AN ORGANIZED HEALTH CARE EDUCATION/TRAINING PROGRAM

## 2020-01-01 PROCEDURE — 81001 URINALYSIS AUTO W/SCOPE: CPT

## 2020-01-01 PROCEDURE — 86304 IMMUNOASSAY TUMOR CA 125: CPT

## 2020-01-01 PROCEDURE — 94761 N-INVAS EAR/PLS OXIMETRY MLT: CPT

## 2020-01-01 PROCEDURE — 83735 ASSAY OF MAGNESIUM: CPT

## 2020-01-01 PROCEDURE — 63600175 PHARM REV CODE 636 W HCPCS: Performed by: EMERGENCY MEDICINE

## 2020-01-01 PROCEDURE — 80053 COMPREHEN METABOLIC PANEL: CPT | Mod: 91

## 2020-01-01 PROCEDURE — 80053 COMPREHEN METABOLIC PANEL: CPT

## 2020-01-01 PROCEDURE — 84484 ASSAY OF TROPONIN QUANT: CPT | Mod: 91

## 2020-01-01 PROCEDURE — 86256 FLUORESCENT ANTIBODY TITER: CPT

## 2020-01-01 PROCEDURE — 92610 EVALUATE SWALLOWING FUNCTION: CPT

## 2020-01-01 PROCEDURE — 82728 ASSAY OF FERRITIN: CPT

## 2020-01-01 PROCEDURE — 99291 CRITICAL CARE FIRST HOUR: CPT | Mod: ,,, | Performed by: EMERGENCY MEDICINE

## 2020-01-01 PROCEDURE — 99900035 HC TECH TIME PER 15 MIN (STAT)

## 2020-01-01 PROCEDURE — 86850 RBC ANTIBODY SCREEN: CPT

## 2020-01-01 PROCEDURE — 27000221 HC OXYGEN, UP TO 24 HOURS

## 2020-01-01 PROCEDURE — 85025 COMPLETE CBC W/AUTO DIFF WBC: CPT | Mod: 91

## 2020-01-01 PROCEDURE — 87205 SMEAR GRAM STAIN: CPT

## 2020-01-01 PROCEDURE — 80061 LIPID PANEL: CPT

## 2020-01-01 PROCEDURE — C9113 INJ PANTOPRAZOLE SODIUM, VIA: HCPCS | Performed by: STUDENT IN AN ORGANIZED HEALTH CARE EDUCATION/TRAINING PROGRAM

## 2020-01-01 PROCEDURE — 97110 THERAPEUTIC EXERCISES: CPT

## 2020-01-01 PROCEDURE — 84132 ASSAY OF SERUM POTASSIUM: CPT

## 2020-01-01 PROCEDURE — 85730 THROMBOPLASTIN TIME PARTIAL: CPT

## 2020-01-01 PROCEDURE — 36620 INSERTION CATHETER ARTERY: CPT | Mod: 59,,, | Performed by: EMERGENCY MEDICINE

## 2020-01-01 PROCEDURE — 31500 INSERT EMERGENCY AIRWAY: CPT | Mod: ,,, | Performed by: EMERGENCY MEDICINE

## 2020-01-01 PROCEDURE — 97165 OT EVAL LOW COMPLEX 30 MIN: CPT

## 2020-01-01 PROCEDURE — 83735 ASSAY OF MAGNESIUM: CPT | Mod: 91

## 2020-01-01 PROCEDURE — 86664 EPSTEIN-BARR NUCLEAR ANTIGEN: CPT

## 2020-01-01 PROCEDURE — 87070 CULTURE OTHR SPECIMN AEROBIC: CPT

## 2020-01-01 PROCEDURE — 80202 ASSAY OF VANCOMYCIN: CPT

## 2020-01-01 PROCEDURE — 97530 THERAPEUTIC ACTIVITIES: CPT

## 2020-01-01 PROCEDURE — 80076 HEPATIC FUNCTION PANEL: CPT

## 2020-01-01 PROCEDURE — 99223 1ST HOSP IP/OBS HIGH 75: CPT | Mod: ,,, | Performed by: INTERNAL MEDICINE

## 2020-01-01 PROCEDURE — 31500 PR INSERT, EMERGENCY ENDOTRACH AIRWAY: ICD-10-PCS | Mod: ,,, | Performed by: EMERGENCY MEDICINE

## 2020-01-01 PROCEDURE — 76937 US GUIDE VASCULAR ACCESS: CPT

## 2020-01-01 PROCEDURE — 86301 IMMUNOASSAY TUMOR CA 19-9: CPT

## 2020-01-01 PROCEDURE — 82378 CARCINOEMBRYONIC ANTIGEN: CPT

## 2020-01-01 PROCEDURE — 82565 ASSAY OF CREATININE: CPT

## 2020-01-01 PROCEDURE — 87040 BLOOD CULTURE FOR BACTERIA: CPT

## 2020-01-01 PROCEDURE — 99223 PR INITIAL HOSPITAL CARE,LEVL III: ICD-10-PCS | Mod: ,,, | Performed by: INTERNAL MEDICINE

## 2020-01-01 PROCEDURE — 20000000 HC ICU ROOM

## 2020-01-01 PROCEDURE — 25000003 PHARM REV CODE 250: Performed by: EMERGENCY MEDICINE

## 2020-01-01 PROCEDURE — 80307 DRUG TEST PRSMV CHEM ANLYZR: CPT

## 2020-01-01 PROCEDURE — 85025 COMPLETE CBC W/AUTO DIFF WBC: CPT

## 2020-01-01 PROCEDURE — 36556 PR INSERT NON-TUNNEL CV CATH 5+ YRS OLD: ICD-10-PCS | Mod: 59,,, | Performed by: EMERGENCY MEDICINE

## 2020-01-01 PROCEDURE — 82105 ALPHA-FETOPROTEIN SERUM: CPT

## 2020-01-01 PROCEDURE — 83605 ASSAY OF LACTIC ACID: CPT

## 2020-01-01 PROCEDURE — 99223 PR INITIAL HOSPITAL CARE,LEVL III: ICD-10-PCS | Mod: GC,,, | Performed by: INTERNAL MEDICINE

## 2020-01-01 PROCEDURE — 82746 ASSAY OF FOLIC ACID SERUM: CPT

## 2020-01-01 PROCEDURE — 84540 ASSAY OF URINE/UREA-N: CPT

## 2020-01-01 PROCEDURE — 86644 CMV ANTIBODY: CPT

## 2020-01-01 PROCEDURE — 80074 ACUTE HEPATITIS PANEL: CPT

## 2020-01-01 PROCEDURE — C1751 CATH, INF, PER/CENT/MIDLINE: HCPCS

## 2020-01-01 PROCEDURE — 36556 INSERT NON-TUNNEL CV CATH: CPT | Mod: 59,,, | Performed by: EMERGENCY MEDICINE

## 2020-01-01 PROCEDURE — 86235 NUCLEAR ANTIGEN ANTIBODY: CPT | Mod: 59

## 2020-01-01 PROCEDURE — 86645 CMV ANTIBODY IGM: CPT

## 2020-01-01 PROCEDURE — 83036 HEMOGLOBIN GLYCOSYLATED A1C: CPT

## 2020-01-01 PROCEDURE — 82784 ASSAY IGA/IGD/IGG/IGM EACH: CPT | Mod: 59

## 2020-01-01 PROCEDURE — 86235 NUCLEAR ANTIGEN ANTIBODY: CPT | Mod: 91

## 2020-01-01 PROCEDURE — 97162 PT EVAL MOD COMPLEX 30 MIN: CPT

## 2020-01-01 PROCEDURE — 94002 VENT MGMT INPAT INIT DAY: CPT

## 2020-01-01 PROCEDURE — 99291 PR CRITICAL CARE, E/M 30-74 MINUTES: ICD-10-PCS | Mod: 25,,, | Performed by: EMERGENCY MEDICINE

## 2020-01-01 PROCEDURE — 84439 ASSAY OF FREE THYROXINE: CPT | Mod: 91

## 2020-01-01 PROCEDURE — 86694 HERPES SIMPLEX NES ANTBDY: CPT

## 2020-01-01 PROCEDURE — 86039 ANTINUCLEAR ANTIBODIES (ANA): CPT

## 2020-01-01 PROCEDURE — 82784 ASSAY IGA/IGD/IGG/IGM EACH: CPT

## 2020-01-01 PROCEDURE — 87529 HSV DNA AMP PROBE: CPT

## 2020-01-01 PROCEDURE — 37799 UNLISTED PX VASCULAR SURGERY: CPT

## 2020-01-01 PROCEDURE — 82607 VITAMIN B-12: CPT

## 2020-01-01 PROCEDURE — 99223 1ST HOSP IP/OBS HIGH 75: CPT | Mod: GC,,, | Performed by: INTERNAL MEDICINE

## 2020-01-01 PROCEDURE — 36620 PR INSERT CATH,ART,PERCUT,SHORTTERM: ICD-10-PCS | Mod: 59,,, | Performed by: EMERGENCY MEDICINE

## 2020-01-01 PROCEDURE — 83540 ASSAY OF IRON: CPT

## 2020-01-01 PROCEDURE — 25000003 PHARM REV CODE 250

## 2020-01-01 PROCEDURE — 36410 VNPNXR 3YR/> PHY/QHP DX/THER: CPT

## 2020-01-01 PROCEDURE — 82330 ASSAY OF CALCIUM: CPT

## 2020-01-01 PROCEDURE — 82550 ASSAY OF CK (CPK): CPT

## 2020-01-01 PROCEDURE — 86038 ANTINUCLEAR ANTIBODIES: CPT

## 2020-01-01 RX ORDER — FENTANYL CITRATE 50 UG/ML
INJECTION, SOLUTION INTRAMUSCULAR; INTRAVENOUS
Status: COMPLETED
Start: 2020-01-01 | End: 2020-01-01

## 2020-01-01 RX ORDER — CALCIUM CHLORIDE INJECTION 100 MG/ML
INJECTION, SOLUTION INTRAVENOUS CODE/TRAUMA/SEDATION MEDICATION
Status: COMPLETED | OUTPATIENT
Start: 2020-01-01 | End: 2020-01-01

## 2020-01-01 RX ORDER — LACTULOSE 10 G/15ML
15 SOLUTION ORAL 3 TIMES DAILY
Status: DISCONTINUED | OUTPATIENT
Start: 2020-01-01 | End: 2020-01-01

## 2020-01-01 RX ORDER — GLUCAGON 1 MG
1 KIT INJECTION
Status: DISCONTINUED | OUTPATIENT
Start: 2020-01-01 | End: 2020-01-01

## 2020-01-01 RX ORDER — METOPROLOL TARTRATE 1 MG/ML
INJECTION, SOLUTION INTRAVENOUS
Status: COMPLETED
Start: 2020-01-01 | End: 2020-01-01

## 2020-01-01 RX ORDER — ONDANSETRON 2 MG/ML
4 INJECTION INTRAMUSCULAR; INTRAVENOUS EVERY 8 HOURS PRN
Status: DISCONTINUED | OUTPATIENT
Start: 2020-01-01 | End: 2020-01-01 | Stop reason: HOSPADM

## 2020-01-01 RX ORDER — EPINEPHRINE 0.1 MG/ML
INJECTION INTRAVENOUS CODE/TRAUMA/SEDATION MEDICATION
Status: COMPLETED | OUTPATIENT
Start: 2020-01-01 | End: 2020-01-01

## 2020-01-01 RX ORDER — MAGNESIUM SULFATE HEPTAHYDRATE 40 MG/ML
2 INJECTION, SOLUTION INTRAVENOUS ONCE
Status: COMPLETED | OUTPATIENT
Start: 2020-01-01 | End: 2020-01-01

## 2020-01-01 RX ORDER — PANTOPRAZOLE SODIUM 40 MG/10ML
40 INJECTION, POWDER, LYOPHILIZED, FOR SOLUTION INTRAVENOUS DAILY
Status: DISCONTINUED | OUTPATIENT
Start: 2020-01-01 | End: 2020-01-01

## 2020-01-01 RX ORDER — FUROSEMIDE 10 MG/ML
80 INJECTION INTRAMUSCULAR; INTRAVENOUS ONCE
Status: COMPLETED | OUTPATIENT
Start: 2020-01-01 | End: 2020-01-01

## 2020-01-01 RX ORDER — PHENYLEPHRINE HCL IN 0.9% NACL 20MG/250ML
0.5 PLASTIC BAG, INJECTION (ML) INTRAVENOUS CONTINUOUS
Status: DISCONTINUED | OUTPATIENT
Start: 2020-01-01 | End: 2020-01-01 | Stop reason: HOSPADM

## 2020-01-01 RX ORDER — LORAZEPAM 2 MG/ML
1 INJECTION INTRAMUSCULAR EVERY 30 MIN PRN
Status: DISCONTINUED | OUTPATIENT
Start: 2020-01-01 | End: 2020-01-01 | Stop reason: HOSPADM

## 2020-01-01 RX ORDER — LIDOCAINE AND PRILOCAINE 25; 25 MG/G; MG/G
CREAM TOPICAL ONCE
Status: COMPLETED | OUTPATIENT
Start: 2020-01-01 | End: 2020-01-01

## 2020-01-01 RX ORDER — ADENOSINE 3 MG/ML
INJECTION, SOLUTION INTRAVENOUS
Status: COMPLETED
Start: 2020-01-01 | End: 2020-01-01

## 2020-01-01 RX ORDER — POTASSIUM CHLORIDE 7.45 MG/ML
10 INJECTION INTRAVENOUS
Status: COMPLETED | OUTPATIENT
Start: 2020-01-01 | End: 2020-01-01

## 2020-01-01 RX ORDER — SCOLOPAMINE TRANSDERMAL SYSTEM 1 MG/1
1 PATCH, EXTENDED RELEASE TRANSDERMAL
Status: DISCONTINUED | OUTPATIENT
Start: 2020-01-01 | End: 2020-01-01 | Stop reason: HOSPADM

## 2020-01-01 RX ORDER — PROMETHAZINE HYDROCHLORIDE 12.5 MG/1
25 TABLET ORAL EVERY 6 HOURS PRN
Status: DISCONTINUED | OUTPATIENT
Start: 2020-01-01 | End: 2020-01-01 | Stop reason: HOSPADM

## 2020-01-01 RX ORDER — NAPROXEN 375 MG/1
TABLET ORAL
Qty: 60 TABLET | Refills: 0 | OUTPATIENT
Start: 2020-01-01

## 2020-01-01 RX ORDER — SUCCINYLCHOLINE CHLORIDE 20 MG/ML
INJECTION INTRAMUSCULAR; INTRAVENOUS
Status: DISCONTINUED
Start: 2020-01-01 | End: 2020-01-01 | Stop reason: HOSPADM

## 2020-01-01 RX ORDER — SODIUM CHLORIDE 0.9 % (FLUSH) 0.9 %
10 SYRINGE (ML) INJECTION
Status: DISCONTINUED | OUTPATIENT
Start: 2020-01-01 | End: 2020-01-01 | Stop reason: HOSPADM

## 2020-01-01 RX ORDER — LACTULOSE 10 G/15ML
10 SOLUTION ORAL 3 TIMES DAILY
Status: DISCONTINUED | OUTPATIENT
Start: 2020-01-01 | End: 2020-01-01

## 2020-01-01 RX ORDER — MORPHINE SULFATE 2 MG/ML
2 INJECTION, SOLUTION INTRAMUSCULAR; INTRAVENOUS EVERY 4 HOURS PRN
Status: DISCONTINUED | OUTPATIENT
Start: 2020-01-01 | End: 2020-01-01 | Stop reason: HOSPADM

## 2020-01-01 RX ORDER — LIDOCAINE 50 MG/G
1 PATCH TOPICAL
Status: DISCONTINUED | OUTPATIENT
Start: 2020-01-01 | End: 2020-01-01 | Stop reason: HOSPADM

## 2020-01-01 RX ORDER — ROCURONIUM BROMIDE 10 MG/ML
INJECTION, SOLUTION INTRAVENOUS
Status: COMPLETED
Start: 2020-01-01 | End: 2020-01-01

## 2020-01-01 RX ORDER — LIDOCAINE 50 MG/G
1 PATCH TOPICAL ONCE
Status: COMPLETED | OUTPATIENT
Start: 2020-01-01 | End: 2020-01-01

## 2020-01-01 RX ORDER — INDOMETHACIN 25 MG/1
CAPSULE ORAL
Status: COMPLETED
Start: 2020-01-01 | End: 2020-01-01

## 2020-01-01 RX ORDER — INSULIN ASPART 100 [IU]/ML
0-5 INJECTION, SOLUTION INTRAVENOUS; SUBCUTANEOUS EVERY 6 HOURS PRN
Status: DISCONTINUED | OUTPATIENT
Start: 2020-01-01 | End: 2020-01-01

## 2020-01-01 RX ORDER — METOPROLOL TARTRATE 1 MG/ML
5 INJECTION, SOLUTION INTRAVENOUS
Status: DISCONTINUED | OUTPATIENT
Start: 2020-01-01 | End: 2020-01-01 | Stop reason: HOSPADM

## 2020-01-01 RX ORDER — FERROUS SULFATE 325(65) MG
325 TABLET, DELAYED RELEASE (ENTERIC COATED) ORAL DAILY
Status: DISCONTINUED | OUTPATIENT
Start: 2020-01-01 | End: 2020-01-01

## 2020-01-01 RX ORDER — ETOMIDATE 2 MG/ML
INJECTION INTRAVENOUS
Status: COMPLETED
Start: 2020-01-01 | End: 2020-01-01

## 2020-01-01 RX ORDER — SODIUM BICARBONATE 1 MEQ/ML
SYRINGE (ML) INTRAVENOUS CODE/TRAUMA/SEDATION MEDICATION
Status: COMPLETED | OUTPATIENT
Start: 2020-01-01 | End: 2020-01-01

## 2020-01-01 RX ORDER — PROPOFOL 10 MG/ML
INJECTION, EMULSION INTRAVENOUS
Status: COMPLETED
Start: 2020-01-01 | End: 2020-01-01

## 2020-01-01 RX ORDER — METRONIDAZOLE 500 MG/100ML
500 INJECTION, SOLUTION INTRAVENOUS
Status: DISCONTINUED | OUTPATIENT
Start: 2020-01-01 | End: 2020-01-01

## 2020-01-01 RX ORDER — INDOMETHACIN 25 MG/1
CAPSULE ORAL
Status: DISCONTINUED
Start: 2020-01-01 | End: 2020-01-01 | Stop reason: HOSPADM

## 2020-01-01 RX ORDER — SERTRALINE HYDROCHLORIDE 25 MG/1
25 TABLET, FILM COATED ORAL DAILY
Status: DISCONTINUED | OUTPATIENT
Start: 2020-01-01 | End: 2020-01-01

## 2020-01-01 RX ORDER — NOREPINEPHRINE BITARTRATE/D5W 4MG/250ML
0.02 PLASTIC BAG, INJECTION (ML) INTRAVENOUS CONTINUOUS
Status: DISCONTINUED | OUTPATIENT
Start: 2020-01-01 | End: 2020-01-01 | Stop reason: HOSPADM

## 2020-01-01 RX ORDER — IPRATROPIUM BROMIDE AND ALBUTEROL SULFATE 2.5; .5 MG/3ML; MG/3ML
3 SOLUTION RESPIRATORY (INHALATION) EVERY 4 HOURS PRN
Status: DISCONTINUED | OUTPATIENT
Start: 2020-01-01 | End: 2020-01-01 | Stop reason: HOSPADM

## 2020-01-01 RX ORDER — FLUTICASONE FUROATE AND VILANTEROL 100; 25 UG/1; UG/1
1 POWDER RESPIRATORY (INHALATION) DAILY
Status: DISCONTINUED | OUTPATIENT
Start: 2020-01-01 | End: 2020-01-01

## 2020-01-01 RX ORDER — FOLIC ACID 1 MG/1
1 TABLET ORAL DAILY
Status: DISCONTINUED | OUTPATIENT
Start: 2020-01-01 | End: 2020-01-01

## 2020-01-01 RX ORDER — PROPOFOL 10 MG/ML
10 INJECTION, EMULSION INTRAVENOUS CONTINUOUS
Status: DISCONTINUED | OUTPATIENT
Start: 2020-01-01 | End: 2020-01-01 | Stop reason: HOSPADM

## 2020-01-01 RX ORDER — NOREPINEPHRINE BITARTRATE/D5W 4MG/250ML
PLASTIC BAG, INJECTION (ML) INTRAVENOUS
Status: COMPLETED
Start: 2020-01-01 | End: 2020-01-01

## 2020-01-01 RX ORDER — NAPROXEN SODIUM 220 MG/1
81 TABLET, FILM COATED ORAL DAILY
Status: DISCONTINUED | OUTPATIENT
Start: 2020-01-01 | End: 2020-01-01

## 2020-01-01 RX ORDER — CEFEPIME HYDROCHLORIDE 2 G/1
2 INJECTION, POWDER, FOR SOLUTION INTRAVENOUS
Status: DISCONTINUED | OUTPATIENT
Start: 2020-01-01 | End: 2020-01-01

## 2020-01-01 RX ADMIN — METRONIDAZOLE 500 MG: 500 INJECTION, SOLUTION INTRAVENOUS at 07:05

## 2020-01-01 RX ADMIN — PHYTONADIONE 10 MG: 10 INJECTION, EMULSION INTRAMUSCULAR; INTRAVENOUS; SUBCUTANEOUS at 09:05

## 2020-01-01 RX ADMIN — MORPHINE SULFATE 2 MG: 2 INJECTION, SOLUTION INTRAMUSCULAR; INTRAVENOUS at 04:05

## 2020-01-01 RX ADMIN — EPINEPHRINE 1 MG: 0.1 INJECTION, SOLUTION ENDOTRACHEAL; INTRACARDIAC; INTRAVENOUS at 12:05

## 2020-01-01 RX ADMIN — POTASSIUM CHLORIDE 10 MEQ: 7.46 INJECTION, SOLUTION INTRAVENOUS at 12:05

## 2020-01-01 RX ADMIN — ACETYLCYSTEINE 6.25 MG/KG/HR: 200 INJECTION, SOLUTION INTRAVENOUS at 04:05

## 2020-01-01 RX ADMIN — Medication 0.05 MCG/KG/MIN: at 11:05

## 2020-01-01 RX ADMIN — DEXTROSE MONOHYDRATE 750 MG: 50 INJECTION, SOLUTION INTRAVENOUS at 02:05

## 2020-01-01 RX ADMIN — LORAZEPAM 1 MG: 2 INJECTION INTRAMUSCULAR; INTRAVENOUS at 04:05

## 2020-01-01 RX ADMIN — PROPOFOL 30 MCG/KG/MIN: 10 INJECTION, EMULSION INTRAVENOUS at 11:05

## 2020-01-01 RX ADMIN — FLUTICASONE FUROATE AND VILANTEROL TRIFENATATE 1 PUFF: 100; 25 POWDER RESPIRATORY (INHALATION) at 07:05

## 2020-01-01 RX ADMIN — SODIUM BICARBONATE 50 MEQ: 84 INJECTION, SOLUTION INTRAVENOUS at 12:05

## 2020-01-01 RX ADMIN — SODIUM CHLORIDE, SODIUM LACTATE, POTASSIUM CHLORIDE, AND CALCIUM CHLORIDE 250 ML: .6; .31; .03; .02 INJECTION, SOLUTION INTRAVENOUS at 05:05

## 2020-01-01 RX ADMIN — SODIUM CHLORIDE, SODIUM LACTATE, POTASSIUM CHLORIDE, AND CALCIUM CHLORIDE 500 ML: .6; .31; .03; .02 INJECTION, SOLUTION INTRAVENOUS at 12:05

## 2020-01-01 RX ADMIN — FLUTICASONE FUROATE AND VILANTEROL TRIFENATATE 1 PUFF: 100; 25 POWDER RESPIRATORY (INHALATION) at 08:05

## 2020-01-01 RX ADMIN — METRONIDAZOLE 500 MG: 500 INJECTION, SOLUTION INTRAVENOUS at 09:05

## 2020-01-01 RX ADMIN — ACETYLCYSTEINE 5900 MG: 200 INJECTION, SOLUTION INTRAVENOUS at 02:05

## 2020-01-01 RX ADMIN — IPRATROPIUM BROMIDE AND ALBUTEROL SULFATE 3 ML: .5; 3 SOLUTION RESPIRATORY (INHALATION) at 02:05

## 2020-01-01 RX ADMIN — CALCIUM CHLORIDE 1 G: 100 INJECTION, SOLUTION INTRAVENOUS at 12:05

## 2020-01-01 RX ADMIN — LACTULOSE 10 G: 20 SOLUTION ORAL at 08:05

## 2020-01-01 RX ADMIN — SERTRALINE 25 MG: 25 TABLET, FILM COATED ORAL at 09:05

## 2020-01-01 RX ADMIN — CEFEPIME 2 G: 2 INJECTION, POWDER, FOR SOLUTION INTRAVENOUS at 11:05

## 2020-01-01 RX ADMIN — Medication 1 MCG/KG/MIN: at 02:05

## 2020-01-01 RX ADMIN — PANTOPRAZOLE SODIUM 40 MG: 40 INJECTION, POWDER, LYOPHILIZED, FOR SOLUTION INTRAVENOUS at 09:05

## 2020-01-01 RX ADMIN — VANCOMYCIN HYDROCHLORIDE 750 MG: 750 INJECTION, POWDER, LYOPHILIZED, FOR SOLUTION INTRAVENOUS at 03:05

## 2020-01-01 RX ADMIN — AMIODARONE HYDROCHLORIDE 1 MG/MIN: 1.8 INJECTION, SOLUTION INTRAVENOUS at 01:05

## 2020-01-01 RX ADMIN — LIDOCAINE 1 PATCH: 50 PATCH TOPICAL at 02:05

## 2020-01-01 RX ADMIN — METRONIDAZOLE 500 MG: 500 INJECTION, SOLUTION INTRAVENOUS at 10:05

## 2020-01-01 RX ADMIN — FOLIC ACID 1 MG: 1 TABLET ORAL at 09:05

## 2020-01-01 RX ADMIN — AMIODARONE HYDROCHLORIDE: 1.5 INJECTION, SOLUTION INTRAVENOUS at 01:05

## 2020-01-01 RX ADMIN — METRONIDAZOLE 500 MG: 500 INJECTION, SOLUTION INTRAVENOUS at 03:05

## 2020-01-01 RX ADMIN — EPINEPHRINE 1 MG: 0.1 INJECTION, SOLUTION ENDOTRACHEAL; INTRACARDIAC; INTRAVENOUS at 10:05

## 2020-01-01 RX ADMIN — FERROUS SULFATE TAB EC 325 MG (65 MG FE EQUIVALENT) 325 MG: 325 (65 FE) TABLET DELAYED RESPONSE at 09:05

## 2020-01-01 RX ADMIN — ETOMIDATE 10 MG: 2 INJECTION, SOLUTION INTRAVENOUS at 10:05

## 2020-01-01 RX ADMIN — FERROUS SULFATE TAB EC 325 MG (65 MG FE EQUIVALENT) 325 MG: 325 (65 FE) TABLET DELAYED RESPONSE at 08:05

## 2020-01-01 RX ADMIN — MAGNESIUM SULFATE IN WATER 2 G: 40 INJECTION, SOLUTION INTRAVENOUS at 06:05

## 2020-01-01 RX ADMIN — VASOPRESSIN 0.04 UNITS/MIN: 20 INJECTION INTRAVENOUS at 12:05

## 2020-01-01 RX ADMIN — FOLIC ACID 1 MG: 1 TABLET ORAL at 08:05

## 2020-01-01 RX ADMIN — SODIUM BICARBONATE: 84 INJECTION, SOLUTION INTRAVENOUS at 01:05

## 2020-01-01 RX ADMIN — PANTOPRAZOLE SODIUM 40 MG: 40 INJECTION, POWDER, LYOPHILIZED, FOR SOLUTION INTRAVENOUS at 08:05

## 2020-01-01 RX ADMIN — METRONIDAZOLE 500 MG: 500 INJECTION, SOLUTION INTRAVENOUS at 01:05

## 2020-01-01 RX ADMIN — ASPIRIN 81 MG 81 MG: 81 TABLET ORAL at 09:05

## 2020-01-01 RX ADMIN — FUROSEMIDE 80 MG: 10 INJECTION, SOLUTION INTRAMUSCULAR; INTRAVENOUS at 11:05

## 2020-01-01 RX ADMIN — SODIUM BICARBONATE 100 MEQ: 84 INJECTION, SOLUTION INTRAVENOUS at 12:05

## 2020-01-01 RX ADMIN — POTASSIUM CHLORIDE 10 MEQ: 7.46 INJECTION, SOLUTION INTRAVENOUS at 01:05

## 2020-01-01 RX ADMIN — LACTULOSE 10 G: 20 SOLUTION ORAL at 03:05

## 2020-01-01 RX ADMIN — METRONIDAZOLE 500 MG: 500 INJECTION, SOLUTION INTRAVENOUS at 08:05

## 2020-01-01 RX ADMIN — PHYTONADIONE 5 MG: 10 INJECTION, EMULSION INTRAMUSCULAR; INTRAVENOUS; SUBCUTANEOUS at 05:05

## 2020-01-01 RX ADMIN — METRONIDAZOLE 500 MG: 500 INJECTION, SOLUTION INTRAVENOUS at 11:05

## 2020-01-01 RX ADMIN — VANCOMYCIN HYDROCHLORIDE 750 MG: 750 INJECTION, POWDER, LYOPHILIZED, FOR SOLUTION INTRAVENOUS at 12:05

## 2020-01-01 RX ADMIN — ROCURONIUM BROMIDE 60 MG: 10 SOLUTION INTRAVENOUS at 10:05

## 2020-01-01 RX ADMIN — FUROSEMIDE 80 MG: 10 INJECTION, SOLUTION INTRAMUSCULAR; INTRAVENOUS at 02:05

## 2020-01-01 RX ADMIN — SODIUM CHLORIDE, SODIUM LACTATE, POTASSIUM CHLORIDE, AND CALCIUM CHLORIDE 500 ML: .6; .31; .03; .02 INJECTION, SOLUTION INTRAVENOUS at 03:05

## 2020-01-01 RX ADMIN — LIDOCAINE AND PRILOCAINE: 25; 25 CREAM TOPICAL at 08:05

## 2020-01-01 RX ADMIN — METRONIDAZOLE 500 MG: 500 INJECTION, SOLUTION INTRAVENOUS at 02:05

## 2020-01-01 RX ADMIN — HYDROCORTISONE SODIUM SUCCINATE 100 MG: 100 INJECTION, POWDER, FOR SOLUTION INTRAMUSCULAR; INTRAVENOUS at 02:05

## 2020-01-01 RX ADMIN — FENTANYL CITRATE 50 MCG: 50 INJECTION INTRAMUSCULAR; INTRAVENOUS at 11:05

## 2020-01-01 RX ADMIN — CEFEPIME 2 G: 2 INJECTION, POWDER, FOR SOLUTION INTRAVENOUS at 01:05

## 2020-01-01 RX ADMIN — METOPROLOL TARTRATE 5 MG: 5 INJECTION INTRAVENOUS at 05:05

## 2020-01-01 RX ADMIN — LIDOCAINE 1 PATCH: 50 PATCH TOPICAL at 07:05

## 2020-01-08 PROBLEM — F33.2 SEVERE EPISODE OF RECURRENT MAJOR DEPRESSIVE DISORDER, WITHOUT PSYCHOTIC FEATURES: Status: ACTIVE | Noted: 2020-01-01

## 2020-01-15 PROBLEM — Z91.199 NONCOMPLIANCE: Status: ACTIVE | Noted: 2020-01-01

## 2020-01-15 PROBLEM — R06.09 DYSPNEA ON EXERTION: Status: ACTIVE | Noted: 2020-01-01

## 2020-01-23 PROBLEM — J18.9 COMMUNITY ACQUIRED PNEUMONIA: Status: ACTIVE | Noted: 2020-01-01

## 2020-01-26 PROBLEM — I31.39 PERICARDIAL EFFUSION: Status: ACTIVE | Noted: 2020-01-01

## 2020-01-28 PROBLEM — Z98.890 S/P PERICARDIOCENTESIS: Status: ACTIVE | Noted: 2020-01-01

## 2020-02-04 PROBLEM — D84.9 IMMUNOCOMPROMISED STATE: Status: ACTIVE | Noted: 2020-01-01

## 2020-02-04 PROBLEM — R63.4 UNINTENDED WEIGHT LOSS: Status: ACTIVE | Noted: 2020-01-01

## 2020-03-23 PROBLEM — R10.12 LUQ PAIN: Status: ACTIVE | Noted: 2020-01-01

## 2020-03-23 PROBLEM — Z20.822 SUSPECTED COVID-19 VIRUS INFECTION: Status: ACTIVE | Noted: 2020-01-01

## 2020-03-23 PROBLEM — J12.9 VIRAL PNEUMONIA: Status: ACTIVE | Noted: 2020-01-01

## 2020-03-23 PROBLEM — R06.02 SOB (SHORTNESS OF BREATH): Status: ACTIVE | Noted: 2020-01-01

## 2020-05-14 PROBLEM — R06.09 DYSPNEA ON EXERTION: Status: RESOLVED | Noted: 2020-01-01 | Resolved: 2020-01-01

## 2020-05-14 PROBLEM — R93.89 ABNORMAL CT OF THE CHEST: Status: ACTIVE | Noted: 2020-01-01

## 2020-05-14 PROBLEM — I42.9 CARDIOMYOPATHY: Status: ACTIVE | Noted: 2020-01-01

## 2020-05-14 PROBLEM — I25.10 CAD (CORONARY ARTERY DISEASE): Status: RESOLVED | Noted: 2019-01-01 | Resolved: 2020-01-01

## 2020-05-17 PROBLEM — R06.02 SOB (SHORTNESS OF BREATH): Status: ACTIVE | Noted: 2020-01-01

## 2020-05-18 PROBLEM — R06.02 SOB (SHORTNESS OF BREATH): Status: RESOLVED | Noted: 2020-01-01 | Resolved: 2020-01-01

## 2020-05-18 PROBLEM — R10.12 LUQ PAIN: Status: RESOLVED | Noted: 2020-01-01 | Resolved: 2020-01-01

## 2020-05-20 PROBLEM — I34.0 NONRHEUMATIC MITRAL VALVE REGURGITATION: Status: ACTIVE | Noted: 2020-01-01

## 2020-05-20 PROBLEM — D64.9 ANEMIA: Status: ACTIVE | Noted: 2020-01-01

## 2020-05-20 PROBLEM — I50.30 HEART FAILURE WITH PRESERVED EJECTION FRACTION, CLASS III: Status: ACTIVE | Noted: 2020-01-01

## 2020-05-20 PROBLEM — R07.89 LEFT-SIDED CHEST WALL PAIN: Status: RESOLVED | Noted: 2019-01-01 | Resolved: 2020-01-01

## 2020-05-20 PROBLEM — I25.10 CORONARY ARTERY DISEASE INVOLVING NATIVE CORONARY ARTERY OF NATIVE HEART WITHOUT ANGINA PECTORIS: Status: ACTIVE | Noted: 2020-01-01

## 2020-05-20 PROBLEM — J44.1 COPD EXACERBATION: Status: RESOLVED | Noted: 2019-05-30 | Resolved: 2020-01-01

## 2020-05-20 PROBLEM — I35.1 NONRHEUMATIC AORTIC VALVE INSUFFICIENCY: Status: ACTIVE | Noted: 2020-01-01

## 2020-05-20 PROBLEM — I51.89 DIASTOLIC DYSFUNCTION: Status: ACTIVE | Noted: 2020-01-01

## 2020-05-28 PROBLEM — K76.9 ACUTE LIVER DISEASE: Status: ACTIVE | Noted: 2020-01-01

## 2020-05-28 PROBLEM — K72.00 ACUTE LIVER FAILURE: Status: ACTIVE | Noted: 2020-01-01

## 2020-05-28 NOTE — PLAN OF CARE
[facility] Transfer Acceptance Note       Transferring Facility/Hospital: [facility] ED     Referring Provider/Specialty giving report: Dr. Laquita Valera - emergency med  -Dr. Naylor with hepatology present on conference call.     Accepting Physician for admission to hospital: Shane Ferguson MD, direct report given to Dr. Carreon      Target Destination & Service: [facility] - under Dr. Yamil Carreon    Date of acceptance:  5/28/2020   6:27 PM    Patients name: Sandra Draper     Allergies:  Review of patient's allergies indicates:   Allergen Reactions    Ace inhibitors Other (See Comments)    Amoxil [amoxicillin]      Does not remember what it did. Does not think it caused SOB.    Sulfa (sulfonamide antibiotics) Rash        Reason for transfer:  higher level of care_hepatology consultation      Overview/ Report from Physician/Mid-Level Provider:    HPI:  73 y/o hx of CAD s/p PCI (2015), HFpEF, RA on DMARD(HCQ and MTX), DM, HTN, HLD< COPD, AAA, CKD3, Dementia presented with productive cough and hemoptysis.  Non-specific symptoms of feeling warm and cold, no reported urinary symptoms or bowel symptoms, no change in mental status from baseline.     Symptoms started yesterday, no abdominal pain reported to family, pt cannot give history is oriented to self and hospital, new abdominal tenderness and left sided decreased breath sounds noted on examination.      Patient was recently admitted on 5/17-5/19 with concerns of burning pain under breast, with chest pain and LUQ pain.  She was noted on repeat ECHO to have new worsening EF 40-45%, segmental wall motion abnormalities, reported to be euvolemic, cardiology consulted and did not recommend LHC, she was started on COREG & LOSARTAN.  Discharge summary indicates MTX was discontinued.     Today patient is found with Acute Liver Injury, transaminases >1000s, acute kidney injury, concern for sepsis with leukocytosis, tachycardia, and  elevated lactic acid and new c/o of productive cough with blood tinged sputum.  Patient started on treatment for sepsis and Abdominal ultrasound comments on hypoechoic masses and that main portal vein thrombosed w/o any biliary ductal dilatation.     Discussed with Dr. Valera and she confirmed with pts daughter re:code status and pt is full code, will initiate for transfer and     ED Course - s/p 30cc/kg sepsis bolus, to received vancomycin & levofloxacin, and Flagyl to be added.  Blood cultures drawn     VS: Temp:  [97.5 °F (36.4 °C)-98.2 °F (36.8 °C)]   Pulse:  [103-118]   Resp:  [16-27]   BP: ()/(70-87)   SpO2:  [92 %-100 %]     Labs:  CMP  Sodium   Date Value Ref Range Status   05/28/2020 132 (L) 136 - 145 mmol/L Final     Potassium   Date Value Ref Range Status   05/28/2020 4.7 3.5 - 5.1 mmol/L Final     Chloride   Date Value Ref Range Status   05/28/2020 105 95 - 110 mmol/L Final     CO2   Date Value Ref Range Status   05/28/2020 15 (L) 23 - 29 mmol/L Final     Glucose   Date Value Ref Range Status   05/28/2020 138 (H) 70 - 110 mg/dL Final     BUN, Bld   Date Value Ref Range Status   05/28/2020 42 (H) 8 - 23 mg/dL Final     Creatinine   Date Value Ref Range Status   05/28/2020 2.1 (H) 0.5 - 1.4 mg/dL Final     Calcium   Date Value Ref Range Status   05/28/2020 8.0 (L) 8.7 - 10.5 mg/dL Final     Total Protein   Date Value Ref Range Status   05/28/2020 6.3 6.0 - 8.4 g/dL Final     Albumin   Date Value Ref Range Status   05/28/2020 3.0 (L) 3.5 - 5.2 g/dL Final     Total Bilirubin   Date Value Ref Range Status   05/28/2020 1.6 (H) 0.1 - 1.0 mg/dL Final     Comment:     For infants and newborns, interpretation of results should be based  on gestational age, weight and in agreement with clinical  observations.  Premature Infant recommended reference ranges:  Up to 24 hours.............<8.0 mg/dL  Up to 48 hours............<12.0 mg/dL  3-5 days..................<15.0 mg/dL  6-29 days.................<15.0  mg/dL       Alkaline Phosphatase   Date Value Ref Range Status   05/28/2020 423 (H) 55 - 135 U/L Final     AST   Date Value Ref Range Status   05/28/2020 2,027 (H) 10 - 40 U/L Final     ALT   Date Value Ref Range Status   05/28/2020 1,240 (H) 10 - 44 U/L Final     Anion Gap   Date Value Ref Range Status   05/28/2020 12 8 - 16 mmol/L Final     eGFR if    Date Value Ref Range Status   05/28/2020 25.8 (A) >60 mL/min/1.73 m^2 Final     eGFR if non    Date Value Ref Range Status   05/28/2020 22.4 (A) >60 mL/min/1.73 m^2 Final     Comment:     Calculation used to obtain the estimated glomerular filtration  rate (eGFR) is the CKD-EPI equation.        Lab Results   Component Value Date    WBC 16.91 (H) 05/28/2020    HGB 10.2 (L) 05/28/2020    HCT 32.6 (L) 05/28/2020    MCV 74 (L) 05/28/2020    PLT SEE COMMENT 05/28/2020       Lab Results   Component Value Date    LACTATE 3.9 (HH) 05/28/2020    LACTATE 5.6 (HH) 05/28/2020           Diagnostic Tests/Radiographs: see epic for full details   US Abdomen  FINDINGS:  The visualized portions of the pancreas are within normal limits.   The liver is enlarged measuring 19.4 cm in length. Two hypoechoic masses are noted in the right hepatic lobe measuring 2.2 x 2.0 x 2.5 cm and 2.3 x 1.6 x 1.5 cm.   The main portal vein is thrombosed.  Mild perihepatic ascites is noted.   No intrahepatic biliary ductal dilatation is detected. The common bile duct is within normal limits at 0.6 cm.   The gallbladder is absent.   The right kidney is normal in size measuring 11.5 cm with no evidence of hydronephrosis.   The spleen is absent.    To Do List upon arrival:    1. Obtain Liver Doppler   2. Trend CMP, Lactic Acid, CBC, Coags, check fibrinogen  a. Type & Screen & Blood Consent  3. Trend ABG to monitor status of acidosis if bicarbonate is worse or change in respiratory status  4. If patient with JVD or concern for vol overload of CHF, consider repeat ECHO vs limited  ECHO to evaluate CVP  5. Hepatology Consultation   a. Continue patient on NAC infusion protocol  b. Recent Viral hep screening 12/2019 - repeat & check CARMELA, Anti-Smooth Muscl, Anti-mitochon ab, IgGlevels, discuss further serologies with hepatology.   6. Continue Broad spectrum antibiotic coverage for patient with renal dosed Vancomycin,  She received LEvofloxacin and Flagyl due to listed PCN allergy, (review of MEDS tab shows pt has received ceftriaxone before, could consider Cefepime, or potentially Aztreonam or Carbapenem as alternative broad spectrum gram negative coverage)   7. Strict I/o  8. Monitor daily Ammonia Levels - if concern for worsening encephalopathy start lactulose  9. F/u Pending - BMP, FOBT, ABG         Shane Ferguson M.D.  Attending Physician  [facility] Medicine Dept.  Pager: [phone]

## 2020-05-29 PROBLEM — N17.9 ACUTE KIDNEY INJURY SUPERIMPOSED ON CHRONIC KIDNEY DISEASE: Status: ACTIVE | Noted: 2019-01-01
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
## 2020-05-29 PROBLEM — N18.9 ACUTE KIDNEY INJURY SUPERIMPOSED ON CHRONIC KIDNEY DISEASE: Status: ACTIVE | Noted: 2019-01-01

## 2020-05-29 PROBLEM — I50.42 CHRONIC COMBINED SYSTOLIC AND DIASTOLIC HEART FAILURE: Status: ACTIVE | Noted: 2020-01-01

## 2020-05-29 PROBLEM — I13.0: Status: ACTIVE | Noted: 2019-01-01

## 2020-05-29 PROBLEM — I50.43: Status: ACTIVE | Noted: 2019-01-01

## 2020-05-29 PROBLEM — N18.30: Status: ACTIVE | Noted: 2019-01-01

## 2020-05-29 PROBLEM — J18.9 HAP (HOSPITAL-ACQUIRED PNEUMONIA): Status: ACTIVE | Noted: 2020-01-01

## 2020-05-29 NOTE — ASSESSMENT & PLAN NOTE
At the day of presentation to [facility] [place] patient is found with Acute Liver Injury, transaminases >1000s, acute kidney injury, concern for sepsis with leukocytosis, tachycardia, and elevated lactic acid     Plan:    Obtain Liver Doppler   Trend CMP, Lactic Acid, CBC, Coags, check fibrinogen  a. Type & Screen & Blood Consent  Trend ABG to monitor status of acidosis if bicarbonate is worse or change in respiratory statusIf patient with JVD or concern for vol overload of CHF, consider repeat ECHO vs limited ECHO to evaluate CVP  Hepatology Consultation   b. Continue patient on NAC infusion protocol  c. Recent Viral hep screening 12/2019 - repeat & check CARMELA, Anti-Smooth Muscl, Anti-mitochon ab, IgGlevels, discuss further serologies with hepatology.   2. Continue Broad spectrum antibiotic coverage for patient with renal dosed Vancomycin,  She received LEvofloxacin and Flagyl due to listed PCN allergy, (review of MEDS tab shows pt has received ceftriaxone before; We started Cefepime and metronidazole  Strict I/o  Monitor daily Ammonia Levels - if concern for worsening encephalopathy start lactulose  F/u Pending - BMP, FOBT, ABG

## 2020-05-29 NOTE — PT/OT/SLP EVAL
Speech Language Pathology Evaluation  Bedside Swallow    Patient Name:  Sandra Draper   MRN:  [id number]  Admitting Diagnosis: The encounter diagnosis was Acute liver disease.  PMH CAD, RA, DM, HTN, HLD, COPD, AAA, CKD3 and Dementia pertinent to this visit. Pt with c/o productive cough and hemoptysis also pertinent to this visit.     Recommendations:                 General Recommendations: ST to continue to follow for ongoing swallow assessment.   Diet recommendations Continue NPO except for medications, small amounts of ice chips ok sparingly, provided strict precautions, to improve oral hygiene and salivation     Aspiration Precautions: Eliminate distractions, Frequent oral care and Strict aspiration precautions. Continue to monitor for signs and symptoms of aspiration and discontinue oral feeding should you notice any of the following: watery eyes, reddened facial area, wet vocal quality, increased work of breathing, change in respiratory status, increased congestion, coughing, fever or change in level of alertness  General Precautions: Standard, aspiration, fall, respiratory  Communication strategies:  provide increased time to answer and go to room if call light pushed    History:     Past Medical History:   Diagnosis Date    AAA (abdominal aortic aneurysm)     Acid reflux     Arthritis     Cataract     Chronic pain     COPD (chronic obstructive pulmonary disease)     Coronary artery disease     stents    Diabetes mellitus     Diabetes mellitus, type 2     Fever blister     Hyperlipidemia     Hypertension     Kidney stone     Oxygen deficiency     uses O2 prn    Rheumatoid arteritis     UTI (urinary tract infection) 2018       Past Surgical History:   Procedure Laterality Date    CARDIAC SURGERY      CATARACT EXTRACTION      Bilateral    CHOLECYSTECTOMY      COLONOSCOPY      COLONOSCOPY N/A 5/23/2018    Procedure: COLONOSCOPY;  Surgeon: Thania Rodriguez MD;  Location: [facility];  Service:  "Endoscopy;  Laterality: N/A;    CYSTOSCOPY W/ RETROGRADES Bilateral 10/11/2018    Procedure: CYSTOSCOPY WITH RETROGRADE PYELOGRAM;  Surgeon: Mike Pavon MD;  Location: [facility] OR;  Service: Urology;  Laterality: Bilateral;    CYSTOSCOPY W/ URETERAL STENT PLACEMENT Left 11/13/2019    Procedure: CYSTOSCOPY, WITH URETERAL STENT INSERTION;  Surgeon: Kody Lozano II, MD;  Location: [facility] OR;  Service: Urology;  Laterality: Left;    DILATION OF URETHRA N/A 10/11/2018    Procedure: DILATION-URETHRA;  Surgeon: Mike Pavon MD;  Location: [facility] OR;  Service: Urology;  Laterality: N/A;    ESOPHAGOGASTRODUODENOSCOPY      ESOPHAGOGASTRODUODENOSCOPY N/A 5/23/2018    Procedure: ESOPHAGOGASTRODUODENOSCOPY (EGD);  Surgeon: Thania Rodriguez MD;  Location: [facility];  Service: Endoscopy;  Laterality: N/A;    HYSTERECTOMY      KIDNEY STONE SURGERY      OOPHORECTOMY      PERICARDIOCENTESIS N/A 1/28/2020    Procedure: Pericardiocentesis;  Surgeon: Tommy Flannery MD;  Location: [facility] CATH LAB;  Service: Cardiovascular;  Laterality: N/A;    SPLENECTOMY, TOTAL         Social History: Patient lives with her Daughter in [place], [place].    Prior Intubation HX:  none this admission    Chest X-Rays: 5/28/2020: Stable cardiomegaly.    Persistent airspace opacities left lung with greatest degree of airspace consolidation left perihilar and lower lung zones having somewhat masslike appearance in the left perihilar region.  Left sided effusion and/or pleural thickening.    Focal airspace disease inferior right upper lobe laterally as previously seen.    Diffuse prominence of underlying interstitial markings which may be on a chronic basis.    Prior diet: per chart,  regular (cardiac), thin     Occupation/hobbies/homemaking: Pt reported her Daughter assisted with cooking/meals prior to admit    Subjective     SLP reviewed Pt with RN and MD team, both cleared for tx  Pt presents calm  She explains, "Ice and " "salt"    Pain/Comfort:  · Pain Rating 1: 9/10  · Location - Side 1: Left  · Location - Orientation 1: generalized  · Location 1: back  · Pain Addressed 1: Nurse notified, Distraction, Cessation of Activity  · Pain Rating Post-Intervention 1: 9/10    Objective:     Oral Musculature Evaluation  · Oral Musculature: general weakness  · Dentition: lower dentures  · Secretion Management: other (see comments)  · Mucosal Quality: dry  · Mandibular Strength and Mobility: impaired  · Oral Labial Strength and Mobility: functional coordination  · Lingual Strength and Mobility: functional protrusion(mildly reduced strength)  · Volitional Cough: present, weak   · Volitional Swallow: elicited, timely   · Voice Prior to PO Intake: clear, intermittent breathiness     Bedside Swallow Eval:   Consistencies Assessed:  · Thin liquids : ice chips x3, single straw sips x2  · Puree : tsp bites x2     Oral Phase:   · open-mouth breathing posture t/o assessment  · Dry mouth  · Poor oral acceptance    Pharyngeal Phase:   · multiple spontaneous swallows    Compensatory Strategies  · Effortful swallow    Treatment: Pt found awake and alert in bed with nasal canula in place. Call light in reach, HOB elevated. RN in and out of room t/o session and aware of Pt c/o shortness of breath. Pt with -128 and RR 19-36 as assessment progressed. PO trials limited 2/2 shortness of breath, generalized weakness and  minimal acceptance of advanced textures/trials as she preferred ice chips. Pt was educatedon definition, risks and overt clinical signs of aspiration, implementation of standard aspiration precautions, and SLP POC. She verbalized partial understanding of all education provided and would benefit from ongoing review 2/2 underlying cognitive status. No questions noted. White board updated. Patient remained partially elveated in bed with RN at bedside upon SLP exit from room. RN and MD team notified of findings/recommendations following session. "       Assessment:     Sandra Draper is a 74 y.o. female with an SLP diagnosis of risk of aspiration.  She presents with concerns for adequate efficiency of swallow 2/2 open-mouth breathing posture, elevated respiratory rate and decreased endurance during initial assessment.  ST to continue to follow for ongoing swallow assessment as strength and status improve.     Goals:   Multidisciplinary Problems     SLP Goals        Problem: SLP Goal    Goal Priority Disciplines Outcome   SLP Goal     SLP Ongoing, Progressing   Description:  Speech Language Pathology Goals  Goals expected to be met by 6/4/2020  1.  Pt will participate in ongoing assessment of swallow function to determine safest, least restrictive means of nutrition/hydration  2.  Educate Pt and family on aspiration precautions and SLP POC                          Plan:     · Patient to be seen:  5 x/week   · Plan of Care expires:  06/28/20  · Plan of Care reviewed with:  patient   · SLP Follow-Up:  Yes       Discharge recommendations:  home health speech therapy   Barriers to Discharge: means of nutrition/hydration not yet established    Time Tracking:     SLP Treatment Date:   05/29/20  Speech Start Time:  1258  Speech Stop Time:  1320     Speech Total Time (min):  22 min    Billable Minutes: Eval Swallow and Oral Function 10 and Seld Care/Home Management Training 8    SHAWANDA Portillo., [facility]-SLP  Speech-Language Pathology  Pager: [phone]    05/29/2020

## 2020-05-29 NOTE — ASSESSMENT & PLAN NOTE
Patient is a 74 year old with extensive cardiac history and rheumatoid arthritis who presented to the hospital with hemoptysis and shortness of breath with reported altered mental status.  She was found to have elevated LFTs as well as an acute kidney injury, leukocytosis, lactic acidosis, high INR.    Unclear etiology of the patient's elevated LFTs, however liver injury due to shock seems to be more likely cause.  It is unusual however that the patient's LFTs and creatinine are improving despite worsening of her lactic acidosis.  Other differential diagnosis include drug-induced liver injury Hardy here in the setting of positive Tylenol level, and recent use of amoxicillin.  Viral hepatitis and autoimmune hepatitis are possibilities but less likely.  Ischemic hepatitis or cardiac liver injury seems less likely given improvement of cardiac function on TTE today.  Portal vein thrombosis has been ruled out based on her last ultrasound, however concerning for portal hypertension which might indicate chronic liver disease (possibly due to chronic methotrexate use).  We will send serology studies.      Would continue IV antibiotics to treat possible underlying septic shock, septic workup, as well as IV N-acetylcysteine infusion given the possibility of drug-induced liver injury.    She does have few lesions on ultrasound that were not seen on recent CT scan with IV contrast.  She will need evaluation for underlying malignancy with metastatic disease.    -continue IV N-acetylcysteine infusion  -septic workup, followup cultures, continue broad-spectrum IV antibiotics  -autoimmune and viral hepatitis serologies  -tumor markers sent.  Will need evaluation for underlying malignancy when she clinically improves  -monitor clinically and with LFTs Q 8 hours

## 2020-05-29 NOTE — ASSESSMENT & PLAN NOTE
Patient presented with productive cough and hemoptysis. COVID negative    Broad spectrum Abx: Cefepime and metronidazole

## 2020-05-29 NOTE — PLAN OF CARE
Goals and POC established this date.   Problem: Occupational Therapy Goal  Goal: Occupational Therapy Goal  Description  Goals to be met by: 7 days 6/5/2020     Patient will increase functional independence with ADLs by performing:    Pt to complete UE dressing with MIN A   Pt to complete seated g/h skills with set-up  Pt to complete t/f to commode with CGA  Pt to t/f to chair with CGA  Pt to tolerated OOB to chair x 3-4 hours daily to increase endurance for functional activity.      Outcome: Ongoing, Progressing

## 2020-05-29 NOTE — PLAN OF CARE
05/29/2020    75 y/o with medical issues of CAD s/p PCI (2015), HFpEF, RA on DMARD(HCQ), DM, HTN, HLD< COPD, AAA, CKD3, Dementia presented with productive cough and hemoptysis. Admitted to MICU for AMS and Acute Liver Injury.    Plan:  - Assess volume status  - F/u TTE: EF 60%, indeterminate DD, interatrial septal aneurysm present w/ bulging to left, PA sys 47mmHg, CVP 15mmHg  - Consult Cardiology  - Vit K 10mg IV qd x 3 doses  - Concern for sepsis/infection, f/u cultures  - Continue BS abx (Vanc/Cefepime/Flagyl)  - Continue NAC infusion protocol: Doses 1-3 completed, now on 4th (optional) dose/infusion  - Liver US w/ dop: Sluggish main portal vein velocity suggestive of portal hypertension.  No evidence of portal vein thrombosis.  - CBC, CMP, INR BID  - Trend LFTs  - Trend Lactic Acid (more IVF vs diuresis)  - Troponin trending down    Tim Ferguson MD  Critical Care Medicine  PGY-1

## 2020-05-29 NOTE — CARE UPDATE
05/29/2020    Updated patient's Niece, Ana, on patient status and plan of care. Detailed discussion of possible etiologies of patient's SOB (HF, infection, acute liver failure) and current treatment for these different issues w/ daughter who expressed understanding. All questions answered satisfactorily. Will continue to provide updates daily as more information becomes available from pending studies and patients hospital course progresses.     Tim Ferguson MD  Critical Care Medicine  PGY-1

## 2020-05-29 NOTE — PT/OT/SLP EVAL
"Physical Therapy Evaluation and Treatment    Patient Name:  Sandra Draper   MRN:  [id number]    *co-treatment with OT   Recommendations:     Discharge Recommendations:  home health PT   Discharge Equipment Recommendations: (TBD pending progress )   Barriers to discharge: Decreased caregiver support    Assessment:     Sandra Draper is a 74 y.o. female admitted with a medical diagnosis of <principal problem not specified>.  She presents with the following impairments/functional limitations:  weakness, impaired endurance, impaired self care skills, impaired functional mobilty, impaired balance, gait instability, impaired cardiopulmonary response to activity. Pt assisted to sit EOB, demo'd increased anxiety, requesting to return supine, perseverating on requesting a breathing treatment. Pt with VSS throughout, SpO2 96%. PT attempted calming mechanisms to facilitate continued participation, however, pt required return to supine. Pt demo'd good strength with little assistance required for mobility. Pt would continue to benefit from acute skilled therapy intervention to address deficits and progress toward prior level of function.       Rehab Prognosis: Good; patient would benefit from acute skilled PT services to address these deficits and reach maximum level of function.    Recent Surgery: * No surgery found *      Plan:     During this hospitalization, patient to be seen 3 x/week to address the identified rehab impairments via gait training, therapeutic activities, therapeutic exercises, neuromuscular re-education and progress toward the following goals:    · Plan of Care Expires:  06/29/20    Subjective     Chief Complaint: Pt states "please, give me a break. I need a breathing treatment"   Patient/Family Comments/goals: to get better and return home   Pain/Comfort:  · Pain Rating 1: 10/10  · Location - Orientation 1: generalized  · Location 1: back  · Pain Addressed 1: Reposition, Distraction  · Pain Rating Post-Intervention " "1: (pt did not report )    Patients cultural, spiritual, [employer] conflicts given the current situation: no    Living Environment:  Pt lives with daughter, reports she was requiring assistance for ADLs.    Equipment used at home: (unknown ).  DME owned (not currently used): unknown .  Upon discharge, patient will have assistance from daughter.    Objective:     Communicated with RN prior to session.  Patient found HOB elevated with blood pressure cuff, pulse ox (continuous), telemetry, peripheral IV, oxygen, randolph catheter  upon PT entry to room.    General Precautions: Standard, fall   Orthopedic Precautions:N/A   Braces: N/A     Exams:  · Cognitive Exam:  Patient is oriented to self and place, not time, followed all commands, communicates clearly and fluently  · Gross Motor Coordination:  WFL  · RLE ROM: WFL  · RLE Strength: WFL  · LLE ROM: WFL  · LLE Strength: WFL    Functional Mobility:  · Bed Mobility:     · Supine to Sit: minimum assistance  · Sit to Supine: minimum assistance      Therapeutic Activities and Exercises:   Pt sat up in bed, prior to scooting toward EOB, pt reported increased SOB. Pt with increased RR, SpO2 96%. Pt stated "I need a break" PT encouraged deep breathing and pursed lip breathing techniques. Pt continued to perseverate, attempted to return to supine several times. Pt encouraged to maintain sitting up, maintained for ~3 mins, then began to state "please, I need a breathing treatment". PT attempted to explain that pt had a stable oxygen status. Pt continued to perseverate on breathing. Pt returned to supine and repositioned in bed.   Pt educated on role of PT/POC. Pt verbalized understanding.   Pt encouraged to only perform OOB mobility with assistance from nursing/therapy. Pt agreeable.       AM-PAC 6 CLICK MOBILITY  Total Score:12     Patient left HOB elevated with all lines intact, call button in reach and RN notified.    GOALS:   Multidisciplinary Problems     Physical Therapy " Goals        Problem: Physical Therapy Goal    Goal Priority Disciplines Outcome Goal Variances Interventions   Physical Therapy Goal     PT, PT/OT Ongoing, Progressing     Description:  Goals to be met by: 2020     Patient will increase functional independence with mobility by performin. Supine to sit with Stand-by Assistance  2. Sit to stand transfer with Minimal Assistance  3. Bed to chair transfer with Minimal Assistance using LRAD  4. Lower extremity exercise program x10 reps per handout, with assistance as needed                      History:     Past Medical History:   Diagnosis Date    AAA (abdominal aortic aneurysm)     Acid reflux     Arthritis     Cataract     Chronic pain     COPD (chronic obstructive pulmonary disease)     Coronary artery disease     stents    Diabetes mellitus     Diabetes mellitus, type 2     Fever blister     Hyperlipidemia     Hypertension     Kidney stone     Oxygen deficiency     uses O2 prn    Rheumatoid arteritis     UTI (urinary tract infection)        Past Surgical History:   Procedure Laterality Date    CARDIAC SURGERY      CATARACT EXTRACTION      Bilateral    CHOLECYSTECTOMY      COLONOSCOPY      COLONOSCOPY N/A 2018    Procedure: COLONOSCOPY;  Surgeon: Thania Rodriguez MD;  Location: [facility];  Service: Endoscopy;  Laterality: N/A;    CYSTOSCOPY W/ RETROGRADES Bilateral 10/11/2018    Procedure: CYSTOSCOPY WITH RETROGRADE PYELOGRAM;  Surgeon: Mike Pavon MD;  Location: [facility];  Service: Urology;  Laterality: Bilateral;    CYSTOSCOPY W/ URETERAL STENT PLACEMENT Left 2019    Procedure: CYSTOSCOPY, WITH URETERAL STENT INSERTION;  Surgeon: Kody Lozano II, MD;  Location: [facility];  Service: Urology;  Laterality: Left;    DILATION OF URETHRA N/A 10/11/2018    Procedure: DILATION-URETHRA;  Surgeon: Mike Pavon MD;  Location: [facility];  Service: Urology;  Laterality: N/A;    ESOPHAGOGASTRODUODENOSCOPY       ESOPHAGOGASTRODUODENOSCOPY N/A 5/23/2018    Procedure: ESOPHAGOGASTRODUODENOSCOPY (EGD);  Surgeon: Thania Rodriguez MD;  Location: [facility];  Service: Endoscopy;  Laterality: N/A;    HYSTERECTOMY      KIDNEY STONE SURGERY      OOPHORECTOMY      PERICARDIOCENTESIS N/A 1/28/2020    Procedure: Pericardiocentesis;  Surgeon: Tommy Flannery MD;  Location: [facility] CATH LAB;  Service: Cardiovascular;  Laterality: N/A;    SPLENECTOMY, TOTAL         Time Tracking:     PT Received On: 05/29/20  PT Start Time: 0955     PT Stop Time: 1010  PT Total Time (min): 15 min     Billable Minutes: Evaluation 7 mins  and Therapeutic Exercise 8 mins       Marilin Vang, PT  05/29/2020

## 2020-05-29 NOTE — ASSESSMENT & PLAN NOTE
Home medication review: no anti-DM listed      - HbA1c  - POCT glucose Q 6 hours  - LDISS for NPO patient   - Monitor glucose level and adjust insulin accordingly

## 2020-05-29 NOTE — HPI
73 y/o with medical issues of CAD s/p PCI (2015), HFpEF, RA on DMARD(HCQ and MTX), DM, HTN, HLD< COPD, AAA, CKD3, Dementia presented with productive cough and hemoptysis.  Non-specific symptoms of feeling warm and cold, no reported urinary symptoms or bowel symptoms, no change in mental status from baseline.      Symptoms started 1 day before presentation to [facility], no abdominal pain reported to family, pt cannot give history is oriented to self and hospital, new abdominal tenderness and left sided decreased breath sounds noted on examination.       Patient was recently admitted on 5/17-5/19 with concerns of burning pain under breast, with chest pain and LUQ pain.  She was noted on repeat ECHO to have new worsening EF 40-45%, segmental wall motion abnormalities, reported to be euvolemic, cardiology consulted and did not recommend LHC, she was started on COREG & LOSARTAN.  Discharge summary indicates MTX was discontinued.      At the day of transfer to [facility] patient is found with Acute Liver Injury, transaminases >1000s, acute kidney injury, concern for sepsis with leukocytosis, tachycardia, and elevated lactic acid and new c/o of productive cough with blood tinged sputum.  Patient started on treatment for sepsis and Abdominal ultrasound comments on hypoechoic masses and that main portal vein thrombosed w/o any biliary ductal dilatation.      Discussed with Dr. Valera and she confirmed with pts daughter re:code status and pt is full code, will initiate for transfer and      ED Course - s/p 30cc/kg sepsis bolus, to received vancomycin & levofloxacin, and Flagyl to be added.  Blood cultures drawn

## 2020-05-29 NOTE — ASSESSMENT & PLAN NOTE
Labs on presentation: Cr: 2.1 GFR: 22.4  Labs (5/19): Cr: 1.1 GFR: 48.9      Workup: Urine electrolytes (Na, Urea and Cr), UAR   Daily weight  Strict intake/output  Adjust medications to GFR  Avoid nephrotoxic medications  Maintain MAP > 65  Maintain Hb > 7

## 2020-05-29 NOTE — HPI
Patient is a 74-year-old female with extensive past medical history including coronary artery disease status post PCI in 2015, heart failure with preserved ejection fraction, rheumatoid arthritis on hydroxychloroquine and methotrexate, diabetes mellitus, hypertension, hyperlipidemia, COPD, AAA, CKD stage 3, who presented to the hospital with cough and hemoptysis with ?  Altered mental status (unclear baseline), found to have acute kidney injury, acute elevation of LFTs, elevated INR, leukocytosis lactic acidosis and admitted to the ICU for management of possible sepsis.    Patient is quite disoriented and unable to provide history.  She states that he feels tired with abdominal pain but unable to fully relate her symptoms.  She states that she feels better today overall.  Reported by primary team, patient continues to have cough with bloody sputum.  She was recently admitted for chest pain and found to have a low ejection fraction.  She was started on losartan Coreg.  According to notes the patient's methotrexate was discontinued as well.    Review the patient's medication she was started on Augmentin for possible respiratory tract infection.  She was being evaluated for interstitial lung disease by Pulmonary and rheumatology.    On presentation to the ED she was found to elevated LFTs with AST/ALT 2300/1400.  This has trended down since admission down to 1600/1200.  Creatinine was 2.4 trended down to 2.1.  Lactate has been worsening to 5.1 from 3.9.  Acetaminophen level was positive 13.5.  INR is 2.6.  She had an ultrasound that was suspicious for portal vein thrombosis, however repeat of ultrasound here did not show portal vein thrombosis but sluggish flow through the portal vein  suggestive of portal hypertension.  She has leukocytosis with white count up to 16.3.  The patient is hemodynamically stable not requiring pressors

## 2020-05-29 NOTE — H&P
[facility]  Critical Care Medicine  History & Physical    Patient Name: Sandra Draper  MRN: [id number]  Admission Date: 5/28/2020  Hospital Length of Stay: 1 days  Code Status: Full Code  Attending Physician: Shane Ferguson MD   Primary Care Provider: SAMI Mcpherson   Principal Problem: <principal problem not specified>    Subjective:     HPI:  75 y/o with medical issues of CAD s/p PCI (2015), HFpEF, RA on DMARD(HCQ and MTX), DM, HTN, HLD< COPD, AAA, CKD3, Dementia presented with productive cough and hemoptysis.  Non-specific symptoms of feeling warm and cold, no reported urinary symptoms or bowel symptoms, no change in mental status from baseline.      Symptoms started 1 day before [place] [facility] no abdominal pain reported to family, pt cannot give history is oriented to self and hospital, new abdominal tenderness and left sided decreased breath sounds noted on examination.       Patient was recently admitted on 5/17-5/19 with concerns of burning pain under breast, with chest pain and LUQ pain.  She was noted on repeat ECHO to have new worsening EF 40-45%, segmental wall motion abnormalities, reported to be euvolemic, cardiology consulted and did not recommend LHC, she was started on COREG & LOSARTAN.  Discharge summary indicates MTX was discontinued.      At the day of [place] [facility] [place] is found with Acute Liver Injury, transaminases >1000s, acute kidney injury, concern for sepsis with leukocytosis, tachycardia, and elevated lactic acid and new c/o of productive cough with blood tinged sputum.  Patient started on treatment for sepsis and Abdominal ultrasound comments on hypoechoic masses and that main portal vein thrombosed w/o any biliary ductal dilatation.      Discussed with Dr. Valera and she confirmed with pts daughter re:code status and pt is full code, will initiate for transfer and      ED Course - s/p 30cc/kg sepsis bolus, to received vancomycin & levofloxacin, and Flagyl  to be added.  Blood cultures drawn        Hospital/ICU Course:  No notes on file     Past Medical History:   Diagnosis Date    AAA (abdominal aortic aneurysm)     Acid reflux     Arthritis     Cataract     Chronic pain     COPD (chronic obstructive pulmonary disease)     Coronary artery disease     stents    Diabetes mellitus     Diabetes mellitus, type 2     Fever blister     Hyperlipidemia     Hypertension     Kidney stone     Oxygen deficiency     uses O2 prn    Rheumatoid arteritis     UTI (urinary tract infection) 2018       Past Surgical History:   Procedure Laterality Date    CARDIAC SURGERY      CATARACT EXTRACTION      Bilateral    CHOLECYSTECTOMY      COLONOSCOPY      COLONOSCOPY N/A 5/23/2018    Procedure: COLONOSCOPY;  Surgeon: Thania Rodriguez MD;  Location: [facility];  Service: Endoscopy;  Laterality: N/A;    CYSTOSCOPY W/ RETROGRADES Bilateral 10/11/2018    Procedure: CYSTOSCOPY WITH RETROGRADE PYELOGRAM;  Surgeon: Mike Pavon MD;  Location[facility];  Service: Urology;  Laterality: Bilateral;    CYSTOSCOPY W/ URETERAL STENT PLACEMENT Left 11/13/2019    Procedure: CYSTOSCOPY, WITH URETERAL STENT INSERTION;  Surgeon: Kody Lozano II, MD;  Location: [facility] OR;  Service: Urology;  Laterality: Left;    DILATION OF URETHRA N/A 10/11/2018    Procedure: DILATION-URETHRA;  Surgeon: Mike Pavon MD;  Location: [facility] OR;  Service: Urology;  Laterality: N/A;    ESOPHAGOGASTRODUODENOSCOPY      ESOPHAGOGASTRODUODENOSCOPY N/A 5/23/2018    Procedure: ESOPHAGOGASTRODUODENOSCOPY (EGD);  Surgeon: Thania Rodriguez MD;  Location: [facility];  Service: Endoscopy;  Laterality: N/A;    HYSTERECTOMY      KIDNEY STONE SURGERY      OOPHORECTOMY      PERICARDIOCENTESIS N/A 1/28/2020    Procedure: Pericardiocentesis;  Surgeon: Tommy Flannery MD;  Location: [facility] CATH LAB;  Service: Cardiovascular;  Laterality: N/A;    SPLENECTOMY, TOTAL         Review of patient's allergies  indicates:   Allergen Reactions    Ace inhibitors Other (See Comments)    Amoxil [amoxicillin]      Does not remember what it did. Does not think it caused SOB.    Sulfa (sulfonamide antibiotics) Rash       Family History     Problem Relation (Age of Onset)    Alzheimer's disease Sister, Sister    Breast cancer Sister    Cancer Sister, Brother    Colon cancer Sister    Diabetes Mother, Father    Hypertension Mother, Father    Kidney disease Sister    Lung cancer Brother    Rheum arthritis Mother, Sister, Sister    Skin cancer Brother    Stroke Mother, Father        Tobacco Use    Smoking status: Former Smoker     Packs/day: 0.25     Years: 25.00     Pack years: 6.25     Types: Cigarettes     Start date: 10/29/1957     Last attempt to quit: 1997     Years since quittin.9    Smokeless tobacco: Never Used    Tobacco comment: ex > 20yrs   Substance and Sexual Activity    Alcohol use: No    Drug use: No    Sexual activity: Not Currently      Review of Systems   Unable to perform ROS: Dementia     Objective:     Vital Signs (Most Recent):  Temp: 98.2 °F (36.8 °C) (20)  Pulse: 110 (20)  Resp: (!) 22 (20)  BP: 98/74 (20)  SpO2: 98 % (20) Vital Signs (24h Range):  Temp:  [97.5 °F (36.4 °C)-98.2 °F (36.8 °C)] 98.2 °F (36.8 °C)  Pulse:  [103-118] 110  Resp:  [16-27] 22  SpO2:  [92 %-100 %] 98 %  BP: ()/(70-87) 98/74   Weight: 59 kg (130 lb 1.1 oz)  Body mass index is 25.4 kg/m².    No intake or output data in the 24 hours ending 20    Physical Exam   Constitutional: No distress.   Neck: Neck supple. No JVD present.   Cardiovascular: Normal rate and regular rhythm. Exam reveals no gallop and no friction rub.   No murmur heard.  Pulmonary/Chest: Effort normal. No stridor. No respiratory distress. She has no wheezes. She has rales (right base of the lung).   Decrease air entry of the left base of the lung   Abdominal: Soft. Bowel sounds are  normal. There is no tenderness.   No flapping tremor   Neurological: She is alert.   Oriented to herself and being in [place]   Skin: She is not diaphoretic.   Nursing note and vitals reviewed.      Vents:     Lines/Drains/Airways     Drain                 Ureteral Drain/Stent 11/13/19 0940 Left ureter 6 Fr. 197 days         Urethral Catheter 05/28/20 1746 Non-latex 16 Fr. less than 1 day          Peripheral Intravenous Line                 Peripheral IV - Single Lumen 05/28/20 1117 22 G Right Hand less than 1 day         Peripheral IV - Single Lumen 05/28/20 1303 22 G Left Forearm less than 1 day              Significant Labs:    CBC/Anemia Profile:  Recent Labs   Lab 05/28/20  1303   WBC 16.91*   HGB 10.2*   HCT 32.6*   PLT SEE COMMENT   MCV 74*   RDW 23.6*        Chemistries:  Recent Labs   Lab 05/28/20  1303 05/28/20  1515 05/28/20  1735   * 133* 132*   K 4.9 5.7* 4.7    105 105   CO2 15* 14* 15*   BUN 43* 42* 42*   CREATININE 2.4* 2.3* 2.1*   CALCIUM 9.1 8.4* 8.0*   ALBUMIN 3.4* 3.0*  --    PROT 7.2 6.3  --    BILITOT 1.9* 1.6*  --    ALKPHOS 488* 423*  --    ALT 1,379* 1,240*  --    AST 2,299* 2,027*  --        A1C: No results for input(s): HGBA1C in the last 48 hours.  ABGs:   Recent Labs   Lab 05/28/20  1830   PH 7.35   PCO2 24*   HCO3 13.20*   POCSATURATED 96.4   BE -11.00*     Bilirubin:   Recent Labs   Lab 05/17/20  1830 05/18/20  0445 05/19/20  0435 05/28/20  1303 05/28/20  1515   BILITOT 0.9 0.8 0.9 1.9* 1.6*     Blood Culture: No results for input(s): LABBLOO in the last 48 hours.  BMP:   Recent Labs   Lab 05/28/20  1735   *   *   K 4.7      CO2 15*   BUN 42*   CREATININE 2.1*   CALCIUM 8.0*     CMP:   Recent Labs   Lab 05/28/20  1303 05/28/20  1515 05/28/20  1735   * 133* 132*   K 4.9 5.7* 4.7    105 105   CO2 15* 14* 15*   GLU 88 88 138*   BUN 43* 42* 42*   CREATININE 2.4* 2.3* 2.1*   CALCIUM 9.1 8.4* 8.0*   PROT 7.2 6.3  --    ALBUMIN 3.4* 3.0*  --     BILITOT 1.9* 1.6*  --    ALKPHOS 488* 423*  --    AST 2,299* 2,027*  --    ALT 1,379* 1,240*  --    ANIONGAP 16 14 12   EGFRNONAA 19.0* 20.0* 22.4*     Cardiac Markers: No results for input(s): CKMB, TROPONINT, MYOGLOBIN in the last 48 hours.  Coagulation:   Recent Labs   Lab 05/28/20  1303   INR 2.6*     Lactic Acid:   Recent Labs   Lab 05/28/20  1317 05/28/20  1515   LACTATE 5.6* 3.9*     Lipid Panel: No results for input(s): CHOL, HDL, LDLCALC, TRIG, CHOLHDL in the last 48 hours.  POCT Glucose: No results for input(s): POCTGLUCOSE in the last 48 hours.  Prealbumin: No results for input(s): PREALBUMIN in the last 48 hours.  Respiratory Culture: No results for input(s): GSRESP, RESPIRATORYC in the last 48 hours.  Troponin:   Recent Labs   Lab 05/28/20  1303 05/28/20  1735   TROPONINI 0.287* 0.191*     Urine Culture: No results for input(s): LABURIN in the last 48 hours.  Urine Studies:   Recent Labs   Lab 05/28/20  1415   COLORU Cathy   APPEARANCEUA Cloudy*   PHUR 5.0   SPECGRAV 1.020   PROTEINUA 2+*   GLUCUA Negative   KETONESU Negative   BILIRUBINUA Negative   OCCULTUA 2+*   NITRITE Negative   LEUKOCYTESUR Negative   RBCUA 2   WBCUA 3   BACTERIA None   HYALINECASTS 0     All pertinent labs within the past 24 hours have been reviewed.    Significant Imaging: I have reviewed all pertinent imaging results/findings within the past 24 hours.  I have reviewed and interpreted all pertinent imaging results/findings within the past 24 hours.    Assessment/Plan:     Psychiatric  Severe episode of recurrent major depressive disorder, without psychotic features  Continue home medication: Sertaline    Pulmonary  HAP (hospital-acquired pneumonia)  -- See productive cough    COPD (chronic obstructive pulmonary disease)  Continue home medications: fluticasone furoate-vilanteroL     Duo neubs PRN    Productive cough  Patient presented with productive cough and hemoptysis. COVID negative    Broad spectrum Abx: Cefepime and  metronidazole    Cardiac/Vascular  Chronic combined systolic and diastolic heart failure  1. The left ventricle (LV) is normal in size with mildly depressed systolic function.  2. Anterior, anterolateral, mid anteroseptum & apical septum are hypokinetic.  3. LV ejection fraction is 40-45%. Grade I LV diastolic dysfunction.  4. The right ventricle (RV) is mildly enlarged with normal systolic function.   5. Estimated RVSP is normal (less than 40 mmHg). Pulmonary hypertension is absent.  6. Right atrium is enlarged. Left atrium is normal.  7. Mild mitral regurgitation. Mild to moderate tricuspid regurgitation.  8. Aortic root and ascending aorta are dilated when indexed for pt's BSA. Ascending aorta measures 3.7 cm.  9. Trivial pericardial effusion of no hemodynamic significance.    Home medication: furosemide 20 mg OD daily  euvolemic on exam    Coronary artery disease involving native coronary artery of native heart without angina pectoris  Continue home medication ASA    Stable angina  Continue home medication ASA      Essential hypertension  Home medications: Carvedilol and losartan    Anti-HTN were held due to borderline low BP   Will resume if needed    HLD (hyperlipidemia)  - Rosuvastatin held due to acute liver injury    Renal/  Acute kidney injury superimposed on chronic kidney disease stage 3  Labs on presentation: Cr: 2.1 GFR: 22.4  Labs (5/19): Cr: 1.1 GFR: 48.9      Workup: Urine electrolytes (Na, Urea and Cr), UAR   Daily weight  Strict intake/output  Adjust medications to GFR  Avoid nephrotoxic medications  Maintain MAP > 65  Maintain Hb > 7    Endocrine  Diabetes mellitus type 2 without retinopathy  Home medication review: no anti-DM listed      - HbA1c  - POCT glucose Q 6 hours  - LDISS for NPO patient   - Monitor glucose level and adjust insulin accordingly    GI  Acute liver failure  At the day of presentation to [facility] patient is found with Acute Liver Injury, transaminases >1000s, acute kidney  injury, concern for sepsis with leukocytosis, tachycardia, and elevated lactic acid     Plan:    Obtain Liver Doppler   Trend CMP, Lactic Acid, CBC, Coags, check fibrinogen  a. Type & Screen & Blood Consent  Trend ABG to monitor status of acidosis if bicarbonate is worse or change in respiratory statusIf patient with JVD or concern for vol overload of CHF, consider repeat ECHO vs limited ECHO to evaluate CVP  Hepatology Consultation   b. Continue patient on NAC infusion protocol  c. Recent Viral hep screening 12/2019 - repeat & check CARMELA, Anti-Smooth Muscl, Anti-mitochon ab, IgGlevels, discuss further serologies with hepatology.   2. Continue Broad spectrum antibiotic coverage for patient with renal dosed Vancomycin,  She received LEvofloxacin and Flagyl due to listed PCN allergy, (review of MEDS tab shows pt has received ceftriaxone before; We started Cefepime and metronidazole  Strict I/o  Monitor daily Ammonia Levels - if concern for worsening encephalopathy start lactulose  F/u Pending - BMP, FOBT, ABG     Acute liver disease  Acute liver failure    GERD (gastroesophageal reflux disease)  Pantoprazole 40 mg IV daily    Orthopedic  Rheumatoid arthritis involving both hips  -- See RA    RA (rheumatoid arthritis)  On DMARDS (HCQ and MTX). Last discharge summary indicates MTX was discontinued.       Other  Long-term use of Plaquenil  -- See RA        Critical Care Daily Checklist:    A: Awake: RASS Goal/Actual Goal:    Actual: Peoples Agitation Sedation Scale (RASS): (P) Alert and calm   B: Spontaneous Breathing Trial Performed?     C: SAT & SBT Coordinated?  Room air                      D: Delirium: CAM-ICU Overall CAM-ICU: (P) Negative   E: Early Mobility Performed? No   F: Feeding Goal:    Status:     Current Diet Order   Procedures    Diet NPO Except for: Medication, Sips with Medication     Order Specific Question:   Except for     Answer:   Medication     Order Specific Question:   Except for     Answer:    Sips with Medication      AS: Analgesia/Sedation PRN pain medicine   T: Thromboembolic Prophylaxis SCD   H: HOB > 300 Yes   U: Stress Ulcer Prophylaxis (if needed) Pantoprazole   G: Glucose Control POCT glucose Q 6 hours   B: Bowel Function     I: Indwelling Catheter (Lines & Flaherty) Necessity    D: De-escalation of Antimicrobials/Pharmacotherapies Cefepime and metronidazole    Plan for the day/ETD Abx, NAC, Liver doppler, Hepatology consult    Code Status:  Family/Goals of Care: Full Code         Critical secondary to Patient has a condition that poses threat to life and bodily function: Acute liver failure     Critical care was time spent personally by me on the following activities: development of treatment plan with patient or surrogate and bedside caregivers, discussions with consultants, evaluation of patient's response to treatment, examination of patient, ordering and performing treatments and interventions, ordering and review of laboratory studies, ordering and review of radiographic studies, pulse oximetry, re-evaluation of patient's condition. This critical care time did not overlap with that of any other provider or involve time for any procedures.     Sulma Waldrop MD  Critical Care Medicine  [facility]

## 2020-05-29 NOTE — PLAN OF CARE
Pt remains in CMICU. Pt oriented to person, but inconsistent with time and place, hx of dementia with no acute changes during shift. Afebrile, ST on monitor, BP stable. 2L nasal cannula. Flaherty in place, 1x dose 80 mg Lasix administered. Echo completed. Labs monitored. Speech therapy following patient. Pt NPO, sips water with meds. IV antibiotics administered per order, acetylcysteine drip in place. BG monitored q6.       CMICU DAILY GOALS       A: Awake    RASS: Goal - RASS Goal: 0-->alert and calm  Actual - RASS (Peoples Agitation-Sedation Scale): 0-->alert and calm   Restraint necessity:    B: Breath   SBT: Not intubated   C: Coordinate A & B, analgesics/sedatives   Pain: managed    SAT: Not intubated  D: Delirium   CAM-ICU: Overall CAM-ICU: Negative  E: Early Mobility   MOVE Screen: Pass   Activity: Activity Management: bedrest maintained per order  FAS: Feeding/Nutrition   Diet order: Diet/Nutrition Received: ice chips,   Fluid restriction:    T: Thrombus   DVT prophylaxis: VTE Required Core Measure: (SCDs) Sequential compression device initiated/maintained  H: HOB Elevation   Head of Bed (HOB): HOB at 30-45 degrees  U: Ulcer Prophylaxis   GI: yes  G: Glucose control   managed    S: Skin   Bundle compliance: yes   Bathing/Skin Care: bath, chlorhexidine, bath, complete, linen changed, incontinence care, back care, dressed/undressed Date: 6/29/20 day bath.   B: Bowel Function   constipation   I: Indwelling Catheters   Flaherty necessity:      Urethral Catheter 05/29/20 0010-Reason for Continuing Urinary Catheterization: Critically ill in ICU requiring intensive monitoring   CVC necessity: no   IPAD offered: Not appropriate  D: De-escalation Antibx   Yes  Plan for the day   Monitor labs, echo, hepatology consult.  Family/Goals of care/Code Status   Code Status: Full Code     No acute events throughout day, VS and assessment per flow sheet, patient progressing towards goals as tolerated, plan of care reviewed with  Sandra Draper and family, all concerns addressed, will continue to monitor.

## 2020-05-29 NOTE — CONSULTS
[facility]  Hepatology  Consult Note    Patient Name: Sandra Draper  MRN: [id number]  Admission Date: 5/28/2020  Hospital Length of Stay: 1 days  Attending Provider: Yamil Bain MD   Primary Care Physician: SAMI Mcpherson  Principal Problem:<principal problem not specified>    Hepatology  Consult performed by: Jenni Lowry MD  Consult ordered by: Sulma Waldrop MD        Subjective:     Transplant status: No    HPI:  Patient is a 74-year-old female with extensive past medical history including coronary artery disease status post PCI in 2015, heart failure with preserved ejection fraction, rheumatoid arthritis on hydroxychloroquine and methotrexate, diabetes mellitus, hypertension, hyperlipidemia, COPD, AAA, CKD stage 3, who presented to the hospital with cough and hemoptysis with ?  Altered mental status (unclear baseline), found to have acute kidney injury, acute elevation of LFTs, elevated INR, leukocytosis lactic acidosis and admitted to the ICU for management of possible sepsis.    Patient is quite disoriented and unable to provide history.  She states that he feels tired with abdominal pain but unable to fully relate her symptoms.  She states that she feels better today overall.  Reported by primary team, patient continues to have cough with bloody sputum.  She was recently admitted for chest pain and found to have a low ejection fraction.  She was started on losartan Coreg.  According to notes the patient's methotrexate was discontinued as well.    Review the patient's medication she was started on Augmentin for possible respiratory tract infection.  She was being evaluated for interstitial lung disease by Pulmonary and rheumatology.    On presentation to the ED she was found to elevated LFTs with AST/ALT 2300/1400.  This has trended down since admission down to 1600/1200.  Creatinine was 2.4 trended down to 2.1.  Lactate has been worsening to 5.1 from 3.9.  Acetaminophen level was  positive 13.5.  INR is 2.6.  She had an ultrasound that was suspicious for portal vein thrombosis, however repeat of ultrasound here did not show portal vein thrombosis but sluggish flow through the portal vein  suggestive of portal hypertension.  She has leukocytosis with white count up to 16.3.  The patient is hemodynamically stable not requiring pressors    Review of Systems   Unable to perform ROS: Dementia       Past Medical History:   Diagnosis Date    AAA (abdominal aortic aneurysm)     Acid reflux     Arthritis     Cataract     Chronic pain     COPD (chronic obstructive pulmonary disease)     Coronary artery disease     stents    Diabetes mellitus     Diabetes mellitus, type 2     Fever blister     Hyperlipidemia     Hypertension     Kidney stone     Oxygen deficiency     uses O2 prn    Rheumatoid arteritis     UTI (urinary tract infection) 2018       Past Surgical History:   Procedure Laterality Date    CARDIAC SURGERY      CATARACT EXTRACTION      Bilateral    CHOLECYSTECTOMY      COLONOSCOPY      COLONOSCOPY N/A 5/23/2018    Procedure: COLONOSCOPY;  Surgeon: Thania Rodriguze MD;  Location: [facility];  Service: Endoscopy;  Laterality: N/A;    CYSTOSCOPY W/ RETROGRADES Bilateral 10/11/2018    Procedure: CYSTOSCOPY WITH RETROGRADE PYELOGRAM;  Surgeon: Mike Pavon MD;  Location: [facility] OR;  Service: Urology;  Laterality: Bilateral;    CYSTOSCOPY W/ URETERAL STENT PLACEMENT Left 11/13/2019    Procedure: CYSTOSCOPY, WITH URETERAL STENT INSERTION;  Surgeon: Kody Lozano II, MD;  Location: [facility];  Service: Urology;  Laterality: Left;    DILATION OF URETHRA N/A 10/11/2018    Procedure: DILATION-URETHRA;  Surgeon: Mike Pavon MD;  Location: [facility] OR;  Service: Urology;  Laterality: N/A;    ESOPHAGOGASTRODUODENOSCOPY      ESOPHAGOGASTRODUODENOSCOPY N/A 5/23/2018    Procedure: ESOPHAGOGASTRODUODENOSCOPY (EGD);  Surgeon: Thania Rodriguez MD;  Location: [facility];   Service: Endoscopy;  Laterality: N/A;    HYSTERECTOMY      KIDNEY STONE SURGERY      OOPHORECTOMY      PERICARDIOCENTESIS N/A 2020    Procedure: Pericardiocentesis;  Surgeon: Tommy Flannery MD;  Location: [facility] LAB;  Service: Cardiovascular;  Laterality: N/A;    SPLENECTOMY, TOTAL         Family history of liver disease: No    Review of patient's allergies indicates:   Allergen Reactions    Ace inhibitors Other (See Comments)    Amoxil [amoxicillin]      Does not remember what it did. Does not think it caused SOB.    Sulfa (sulfonamide antibiotics) Rash       Tobacco Use    Smoking status: Former Smoker     Packs/day: 0.25     Years: 25.00     Pack years: 6.25     Types: Cigarettes     Start date: 10/29/1957     Last attempt to quit: 1997     Years since quittin.9    Smokeless tobacco: Never Used    Tobacco comment: ex > 20yrs   Substance and Sexual Activity    Alcohol use: No    Drug use: No    Sexual activity: Not Currently       Medications Prior to Admission   Medication Sig Dispense Refill Last Dose    albuterol (PROVENTIL/VENTOLIN HFA) 90 mcg/actuation inhaler Inhale 1 puff into the lungs every 6 (six) hours as needed for Wheezing or Shortness of Breath. Rescue 1 Inhaler 1 Past Month    amoxicillin-clavulanate 875-125mg (AUGMENTIN) 875-125 mg per tablet Take 1 tablet by mouth 2 (two) times daily. 20 tablet 0 Past Month    aspirin 81 MG Chew Take 1 tablet (81 mg total) by mouth once daily. 1 tablet 0 Taking    azithromycin (Z-FIONA) 250 MG tablet Take 1 tablet (250 mg total) by mouth once daily. Take first 2 tablets together, then 1 every day until finished. 6 tablet 0 Past Month    carvediloL (COREG) 6.25 MG tablet Take 1 tablet (6.25 mg total) by mouth 2 (two) times daily. 60 tablet 0 Past Month    cetirizine (ZYRTEC) 10 MG tablet Take 1 tablet (10 mg total) by mouth once daily. (Patient not taking: Reported on 2020) 20 tablet 0 Not Taking    colchicine (COLCRYS) 0.6  mg tablet Take 1 tablet (0.6 mg total) by mouth once daily. 30 tablet 0 Past Month    esomeprazole (NEXIUM) 40 MG capsule Take 1 capsule (40 mg total) by mouth before breakfast. 30 capsule 11 Past Month    ferrous sulfate 325 (65 FE) MG EC tablet Take 1 tablet (325 mg total) by mouth once daily. 90 tablet 3 Past Month    fluticasone-salmeterol diskus inhaler 250-50 mcg INHALE 1 PUFF INTO THE LUNGS 2 TIMES A DAY 60 each 11 Past Month    folic acid (FOLVITE) 1 MG tablet TAKE 1 TABLET BY MOUTH EVERY DAY. MUST TAKE BECAUSE OF BEING ON METHOTREXATE 90 tablet 3 Past Month    furosemide (LASIX) 20 MG tablet Take 1 tablet (20 mg total) by mouth daily as needed (swelling or weight gain). 30 tablet 0 Past Month    gabapentin (NEURONTIN) 300 MG capsule TK 1 C PO TID  3 Past Month    hydroxychloroquine (PLAQUENIL) 200 mg tablet TAKE 1 TABLET BY MOUTH TWICE DAILY 180 tablet 3 Past Month    ibuprofen (ADVIL,MOTRIN) 600 MG tablet Take 1 tablet (600 mg total) by mouth every 8 (eight) hours as needed for Pain. 15 tablet 0 Past Month    losartan (COZAAR) 25 MG tablet Take 0.5 tablets (12.5 mg total) by mouth once daily. 45 tablet 3 Past Month    mirabegron (MYRBETRIQ) 25 mg Tb24 ER tablet Take 1 tablet (25 mg total) by mouth once daily. 30 tablet 0 Taking    multivitamin (ONE DAILY MULTIVITAMIN) per tablet Take 1 tablet by mouth once daily.   Past Month    nitroGLYCERIN (NITROSTAT) 0.4 MG SL tablet Place 1 tablet (0.4 mg total) under the tongue every 5 (five) minutes as needed for Chest pain. 20 tablet 3 Taking    ondansetron (ZOFRAN) 4 MG tablet Take 1 tablet (4 mg total) by mouth every 8 (eight) hours as needed for Nausea. 20 tablet 0 Past Month    polyethylene glycol (GLYCOLAX) 17 gram PwPk Take 17 g by mouth once daily. 100 each 6 Past Month    predniSONE (DELTASONE) 20 MG tablet Take 2 tabs po daily for 1 week and then 1.5 tabs po daily for 1 week and then 1 tab po daily. 60 tablet 0 Past Month    rosuvastatin  (CRESTOR) 40 MG Tab Take 1 tablet (40 mg total) by mouth every evening. 90 tablet 3 Past Month    sertraline (ZOLOFT) 25 MG tablet Take 1 tablet (25 mg total) by mouth once daily. 30 tablet 11 Past Month    solifenacin (VESICARE) 5 MG tablet Take 1 tablet (5 mg total) by mouth once daily. 30 tablet 11 Past Month    topiramate (TOPAMAX) 25 MG tablet Take 25 mg by mouth 2 (two) times daily.   Past Month       Objective:     Vital Signs (Most Recent):  Temp: 98.1 °F (36.7 °C) (05/29/20 1100)  Pulse: (!) 115 (05/29/20 1300)  Resp: (!) 28 (05/29/20 1300)  BP: 91/62 (05/29/20 1200)  SpO2: 98 % (05/29/20 1300) Vital Signs (24h Range):  Temp:  [97.1 °F (36.2 °C)-98.2 °F (36.8 °C)] 98.1 °F (36.7 °C)  Pulse:  [103-122] 115  Resp:  [16-32] 28  SpO2:  [92 %-100 %] 98 %  BP: ()/(61-87) 91/62     Weight: 59 kg (130 lb) (05/29/20 0843)  Body mass index is 25.39 kg/m².    Physical Exam   Constitutional: She appears lethargic. No distress.   HENT:   Head: Normocephalic.   Eyes: Conjunctivae are normal. No scleral icterus.   Neck: Normal range of motion. Neck supple.   Cardiovascular: Regular rhythm. Tachycardia present.   Pulmonary/Chest: Effort normal and breath sounds normal.   Abdominal: Soft. Bowel sounds are normal. She exhibits no distension and no mass. There is no tenderness. There is no rebound and no guarding.   Musculoskeletal: Normal range of motion.   Neurological: She appears lethargic.   Disoriented.  No asterixis on exam   Skin: Skin is warm and dry.       MELD-Na score: 31 at 5/29/2020  8:55 AM  MELD score: 28 at 5/29/2020  8:55 AM  Calculated from:  Serum Creatinine: 2.2 mg/dL at 5/29/2020  8:55 AM  Serum Sodium: 130 mmol/L at 5/29/2020  8:55 AM  Total Bilirubin: 1.7 mg/dL at 5/29/2020  8:55 AM  INR(ratio): 3.0 at 5/29/2020  8:55 AM  Age: 74 years    Significant Labs:  CBC:   Recent Labs   Lab 05/29/20  0855   WBC 16.03*   RBC 3.86*   HGB 8.9*   HCT 28.3*   PLT 93*     BMP:   Recent Labs   Lab  05/29/20  0855   GLU 91   *   K 4.5      CO2 15*   BUN 43*   CREATININE 2.2*   CALCIUM 8.5*     CMP:   Recent Labs   Lab 05/29/20  0855   GLU 91   CALCIUM 8.5*   ALBUMIN 2.5*   PROT 5.7*   *   K 4.5   CO2 15*      BUN 43*   CREATININE 2.2*   ALKPHOS 376*   ALT 1,343*   AST 1,867*   BILITOT 1.7*     Coagulation:   Recent Labs   Lab 05/28/20  2330 05/29/20  0855   INR  --  3.0*   APTT 29.3  --        Significant Imaging:  Labs: Reviewed  US: Reviewed  CT: Reviewed    Assessment/Plan:     Acute liver disease  Patient is a 74 year old with extensive cardiac history and rheumatoid arthritis who presented to the hospital with hemoptysis and shortness of breath with reported altered mental status.  She was found to have elevated LFTs as well as an acute kidney injury, leukocytosis, lactic acidosis, high INR.    Unclear etiology of the patient's elevated LFTs, however liver injury due to shock seems to be more likely cause.  It is unusual however that the patient's LFTs and creatinine are improving despite worsening of her lactic acidosis.  Other differential diagnosis include drug-induced liver injury Hardy here in the setting of positive Tylenol level, and recent use of amoxicillin.  Viral hepatitis and autoimmune hepatitis are possibilities but less likely.  Ischemic hepatitis or cardiac liver injury seems less likely given improvement of cardiac function on TTE today.  Portal vein thrombosis has been ruled out based on her last ultrasound, however concerning for portal hypertension which might indicate chronic liver disease (possibly due to chronic methotrexate use).  We will send serology studies.      Would continue IV antibiotics to treat possible underlying septic shock, septic workup, as well as IV N-acetylcysteine infusion given the possibility of drug-induced liver injury.    She does have few lesions on ultrasound that were not seen on recent CT scan with IV contrast.  She will need  evaluation for underlying malignancy with metastatic disease.    -continue IV N-acetylcysteine infusion  -septic workup, followup cultures, continue broad-spectrum IV antibiotics  -autoimmune and viral hepatitis serologies  -tumor markers sent.  Will need evaluation for underlying malignancy when she clinically improves  -monitor clinically and with LFTs Q 8 hours          Thank you for your consult. I will follow-up with patient. Please contact us if you have any additional questions.    Mariano Lowry MD  Hepatology  [facility]

## 2020-05-29 NOTE — SUBJECTIVE & OBJECTIVE
Past Medical History:   Diagnosis Date    AAA (abdominal aortic aneurysm)     Acid reflux     Arthritis     Cataract     Chronic pain     COPD (chronic obstructive pulmonary disease)     Coronary artery disease     stents    Diabetes mellitus     Diabetes mellitus, type 2     Fever blister     Hyperlipidemia     Hypertension     Kidney stone     Oxygen deficiency     uses O2 prn    Rheumatoid arteritis     UTI (urinary tract infection) 2018       Past Surgical History:   Procedure Laterality Date    CARDIAC SURGERY      CATARACT EXTRACTION      Bilateral    CHOLECYSTECTOMY      COLONOSCOPY      COLONOSCOPY N/A 5/23/2018    Procedure: COLONOSCOPY;  Surgeon: Thania Rodriguez MD;  Location: [facility];  Service: Endoscopy;  Laterality: N/A;    CYSTOSCOPY W/ RETROGRADES Bilateral 10/11/2018    Procedure: CYSTOSCOPY WITH RETROGRADE PYELOGRAM;  Surgeon: Mike Pavon MD;  [facility];  Service: Urology;  Laterality: Bilateral;    CYSTOSCOPY W/ URETERAL STENT PLACEMENT Left 11/13/2019    Procedure: CYSTOSCOPY, WITH URETERAL STENT INSERTION;  Surgeon: Kody Lozano II, MD;  Location: [facility] OR;  Service: Urology;  Laterality: Left;    DILATION OF URETHRA N/A 10/11/2018    Procedure: DILATION-URETHRA;  Surgeon: Mike Pavon MD;  Location: [facility] OR;  Service: Urology;  Laterality: N/A;    ESOPHAGOGASTRODUODENOSCOPY      ESOPHAGOGASTRODUODENOSCOPY N/A 5/23/2018    Procedure: ESOPHAGOGASTRODUODENOSCOPY (EGD);  Surgeon: Thania Rodriguez MD;  Location: [facility];  Service: Endoscopy;  Laterality: N/A;    HYSTERECTOMY      KIDNEY STONE SURGERY      OOPHORECTOMY      PERICARDIOCENTESIS N/A 1/28/2020    Procedure: Pericardiocentesis;  Surgeon: Tommy Flannery MD;  Location: [facility] CATH LAB;  Service: Cardiovascular;  Laterality: N/A;    SPLENECTOMY, TOTAL         Review of patient's allergies indicates:   Allergen Reactions    Ace inhibitors Other (See Comments)    Amoxil  [amoxicillin]      Does not remember what it did. Does not think it caused SOB.    Sulfa (sulfonamide antibiotics) Rash       Family History     Problem Relation (Age of Onset)    Alzheimer's disease Sister, Sister    Breast cancer Sister    Cancer Sister, Brother    Colon cancer Sister    Diabetes Mother, Father    Hypertension Mother, Father    Kidney disease Sister    Lung cancer Brother    Rheum arthritis Mother, Sister, Sister    Skin cancer Brother    Stroke Mother, Father        Tobacco Use    Smoking status: Former Smoker     Packs/day: 0.25     Years: 25.00     Pack years: 6.25     Types: Cigarettes     Start date: 10/29/1957     Last attempt to quit: 1997     Years since quittin.9    Smokeless tobacco: Never Used    Tobacco comment: ex > 20yrs   Substance and Sexual Activity    Alcohol use: No    Drug use: No    Sexual activity: Not Currently      Review of Systems   Unable to perform ROS: Dementia     Objective:     Vital Signs (Most Recent):  Temp: 98.2 °F (36.8 °C) (20)  Pulse: 110 (20)  Resp: (!) 22 (20)  BP: 98/74 (20)  SpO2: 98 % (20) Vital Signs (24h Range):  Temp:  [97.5 °F (36.4 °C)-98.2 °F (36.8 °C)] 98.2 °F (36.8 °C)  Pulse:  [103-118] 110  Resp:  [16-27] 22  SpO2:  [92 %-100 %] 98 %  BP: ()/(70-87) 98/74   Weight: 59 kg (130 lb 1.1 oz)  Body mass index is 25.4 kg/m².    No intake or output data in the 24 hours ending 20    Physical Exam   Constitutional: No distress.   Neck: Neck supple. No JVD present.   Cardiovascular: Normal rate and regular rhythm. Exam reveals no gallop and no friction rub.   No murmur heard.  Pulmonary/Chest: Effort normal. No stridor. No respiratory distress. She has no wheezes. She has rales (right base of the lung).   Decrease air entry of the left base of the lung   Abdominal: Soft. Bowel sounds are normal. There is no tenderness.   No flapping tremor   Neurological: She is alert.    Oriented to herself and being in [place]   Skin: She is not diaphoretic.   Nursing note and vitals reviewed.      Vents:     Lines/Drains/Airways     Drain                 Ureteral Drain/Stent 11/13/19 0940 Left ureter 6 Fr. 197 days         Urethral Catheter 05/28/20 1746 Non-latex 16 Fr. less than 1 day          Peripheral Intravenous Line                 Peripheral IV - Single Lumen 05/28/20 1117 22 G Right Hand less than 1 day         Peripheral IV - Single Lumen 05/28/20 1303 22 G Left Forearm less than 1 day              Significant Labs:    CBC/Anemia Profile:  Recent Labs   Lab 05/28/20  1303   WBC 16.91*   HGB 10.2*   HCT 32.6*   PLT SEE COMMENT   MCV 74*   RDW 23.6*        Chemistries:  Recent Labs   Lab 05/28/20  1303 05/28/20  1515 05/28/20  1735   * 133* 132*   K 4.9 5.7* 4.7    105 105   CO2 15* 14* 15*   BUN 43* 42* 42*   CREATININE 2.4* 2.3* 2.1*   CALCIUM 9.1 8.4* 8.0*   ALBUMIN 3.4* 3.0*  --    PROT 7.2 6.3  --    BILITOT 1.9* 1.6*  --    ALKPHOS 488* 423*  --    ALT 1,379* 1,240*  --    AST 2,299* 2,027*  --        A1C: No results for input(s): HGBA1C in the last 48 hours.  ABGs:   Recent Labs   Lab 05/28/20  1830   PH 7.35   PCO2 24*   HCO3 13.20*   POCSATURATED 96.4   BE -11.00*     Bilirubin:   Recent Labs   Lab 05/17/20  1830 05/18/20  0445 05/19/20  0435 05/28/20  1303 05/28/20  1515   BILITOT 0.9 0.8 0.9 1.9* 1.6*     Blood Culture: No results for input(s): LABBLOO in the last 48 hours.  BMP:   Recent Labs   Lab 05/28/20  1735   *   *   K 4.7      CO2 15*   BUN 42*   CREATININE 2.1*   CALCIUM 8.0*     CMP:   Recent Labs   Lab 05/28/20  1303 05/28/20  1515 05/28/20  1735   * 133* 132*   K 4.9 5.7* 4.7    105 105   CO2 15* 14* 15*   GLU 88 88 138*   BUN 43* 42* 42*   CREATININE 2.4* 2.3* 2.1*   CALCIUM 9.1 8.4* 8.0*   PROT 7.2 6.3  --    ALBUMIN 3.4* 3.0*  --    BILITOT 1.9* 1.6*  --    ALKPHOS 488* 423*  --    AST 2,299* 2,027*  --    ALT  1,379* 1,240*  --    ANIONGAP 16 14 12   EGFRNONAA 19.0* 20.0* 22.4*     Cardiac Markers: No results for input(s): CKMB, TROPONINT, MYOGLOBIN in the last 48 hours.  Coagulation:   Recent Labs   Lab 05/28/20  1303   INR 2.6*     Lactic Acid:   Recent Labs   Lab 05/28/20  1317 05/28/20  1515   LACTATE 5.6* 3.9*     Lipid Panel: No results for input(s): CHOL, HDL, LDLCALC, TRIG, CHOLHDL in the last 48 hours.  POCT Glucose: No results for input(s): POCTGLUCOSE in the last 48 hours.  Prealbumin: No results for input(s): PREALBUMIN in the last 48 hours.  Respiratory Culture: No results for input(s): GSRESP, RESPIRATORYC in the last 48 hours.  Troponin:   Recent Labs   Lab 05/28/20  1303 05/28/20  1735   TROPONINI 0.287* 0.191*     Urine Culture: No results for input(s): LABURIN in the last 48 hours.  Urine Studies:   Recent Labs   Lab 05/28/20  1415   COLORU Cathy   APPEARANCEUA Cloudy*   PHUR 5.0   SPECGRAV 1.020   PROTEINUA 2+*   GLUCUA Negative   KETONESU Negative   BILIRUBINUA Negative   OCCULTUA 2+*   NITRITE Negative   LEUKOCYTESUR Negative   RBCUA 2   WBCUA 3   BACTERIA None   HYALINECASTS 0     All pertinent labs within the past 24 hours have been reviewed.    Significant Imaging: I have reviewed all pertinent imaging results/findings within the past 24 hours.  I have reviewed and interpreted all pertinent imaging results/findings within the past 24 hours.

## 2020-05-29 NOTE — PLAN OF CARE
CM met with patient at the bedside to discuss D/C POC needs. Patient AAO x's 3 and able to verify demographics in the chart are correct. CM name and contact number listed on the patient's white board.  CM provided explanation of discharge plan process. CM left blue folder at the bedside with explanation of qualification for placement and facility resources. Patient expressed understanding. CM remains available for any further patient needs or concerns.     Cathy Singleton, VALERIAP  [address] / [place] [address]      [id number] - [place], [place] - [address]  [address]  [place] [address]  Phone: [phone] Fax: [phone]    [facility] - [place] - [address]  [address]  [place] [address]  Phone: [phone] Fax: [phone]      Extended Emergency Contact Information  Primary Emergency Contact: Jermaine Mike [place] [address] [place]  Mobile Phone: [phone]  Relation: Daughter    Future Appointments   Date Time Provider Department Center   6/2/2020  6:00 AM [facility]   6/4/2020  1:15 PM Brandon Hlaey MD [facility] RHEUM [facility] ACT   6/9/2020  8:30 AM COVID TESTING, [facility] FAM [facility] FAM MED [facility] ACC   6/11/2020  9:00 AM PULMONARY FUNCTION LAB, [facility] PFS [facility]   6/12/2020 10:25 AM [facility] LAB [facility]   6/12/2020 11:30 AM Manan Rivera MD [facility] CARDIO [facility] 3RD FL   6/16/2020 10:00 AM Kym Shipman NP [facility] ONCOL [facility] ACC   6/16/2020  2:30 PM UROLOGY CLINIC [facility] UROLOGY [facility] G[facility]       Payor: [employer] [facility] MEDICARE / Plan: [employer] [facility] HEALTH / Product Type: Medicare Advantage /        05/29/20 1503   Discharge Assessment   Assessment Type Discharge Planning Assessment   Confirmed/corrected address and phone number on facesheet? Yes   Assessment information obtained from? Patient   Prior to hospitilization  cognitive status: Alert/Oriented   Prior to hospitalization functional status: Independent   Current cognitive status: Alert/Oriented   Current Functional Status: Assistive Equipment   Facility Arrived From: [facility]   Lives With alone   Able to Return to Prior Arrangements other (see comments)  (TBD)   Is patient able to care for self after discharge? Unable to determine at this time (comments)   Who are your caregiver(s) and their phone number(s)? Ana Mike (dtr) [phone]   Patient currently being followed by outpatient case management? No   Patient currently receives any other outside agency services? No   Equipment Currently Used at Home none   Do you have any problems affording any of your prescribed medications? No   Is the patient taking medications as prescribed? yes   Does the patient have transportation home? Yes   Transportation Anticipated family or friend will provide   Discharge Plan A Home   Discharge Plan B Home with family   DME Needed Upon Discharge  other (see comments)  (TBD)       Koko Stanley RN MSN  Critical Care-   Ext. [phone]

## 2020-05-29 NOTE — PLAN OF CARE
Problem: SLP Goal  Goal: SLP Goal  Description  Speech Language Pathology Goals  Goals expected to be met by 6/4/2020  1.  Pt will participate in ongoing assessment of swallow function to determine safest, least restrictive means of nutrition/hydration  2.  Educate Pt and family on aspiration precautions and SLP POC         Outcome: Ongoing, Progressing     SLP Bedside Swallow Study initiated.  PT with minimal PO acceptance and required frequent breaks with minimally accepted PO trials.  Pt with risk of aspiration 2/2 respiratory status, generalized weakness and decreased endurance.   Findings reviewed with RN and MD team.  REC: continue NPO except for medications, occasional ice chips ok sparingly for comfort provided close monitoring and strict aspiration precautions, and ST to continue to follow to assess safety and efficiency of swallow mechanism for resumption of PO diet.     SHAWANDA Portillo., [facility]-SLP  Speech-Language Pathology  Pager: [phone]  5/29/2020

## 2020-05-29 NOTE — ASSESSMENT & PLAN NOTE
1. The left ventricle (LV) is normal in size with mildly depressed systolic function.  2. Anterior, anterolateral, mid anteroseptum & apical septum are hypokinetic.  3. LV ejection fraction is 40-45%. Grade I LV diastolic dysfunction.  4. The right ventricle (RV) is mildly enlarged with normal systolic function.   5. Estimated RVSP is normal (less than 40 mmHg). Pulmonary hypertension is absent.  6. Right atrium is enlarged. Left atrium is normal.  7. Mild mitral regurgitation. Mild to moderate tricuspid regurgitation.  8. Aortic root and ascending aorta are dilated when indexed for pt's BSA. Ascending aorta measures 3.7 cm.  9. Trivial pericardial effusion of no hemodynamic significance.    Home medication: furosemide 20 mg OD daily  euvolemic on exam

## 2020-05-29 NOTE — ASSESSMENT & PLAN NOTE
Home medications: Carvedilol and losartan    Anti-HTN were held due to borderline low BP   Will resume if needed

## 2020-05-29 NOTE — PLAN OF CARE
Problem: Physical Therapy Goal  Goal: Physical Therapy Goal  Description  Goals to be met by: 2020     Patient will increase functional independence with mobility by performin. Supine to sit with Stand-by Assistance  2. Sit to stand transfer with Minimal Assistance  3. Bed to chair transfer with Minimal Assistance using LRAD  4. Lower extremity exercise program x10 reps per handout, with assistance as needed     Outcome: Ongoing, Progressing     Pt evaluated and appropriate goals established.     Marilin Vang, PT, DPT  2020  [phone]

## 2020-05-29 NOTE — PT/OT/SLP EVAL
"Occupational Therapy   Evaluation    Name: Sandra Draper  MRN: [id number]  Admitting Diagnosis:  Acute Liver disease.   PT with hx of dementia, RA, COPD, AA.Pt was t/f from [facility] 5/28/2020 with hepatology consult as found with acute liver injury.  Pt was initially admitted with cough and blood tinged sputum.     Recommendations:     Discharge Recommendations:  HH; Home with family     Assessment:     Sandra Draper is a 74 y.o. female .  Performance deficits affecting function: weakness, gait instability, impaired balance, impaired endurance, impaired self care skills, impaired functional mobilty.  Pt tolerated session well with good effort, but limited by SOB and focused on "I need a breathing treatment." VSS throughout session.     Rehab Prognosis: Good; patient would benefit from acute skilled OT services to address these deficits and reach maximum level of function.       Plan:     Patient to be seen 3 x/week to address the above listed problems via self-care/home management, therapeutic activities, therapeutic exercises  · Plan of Care Expires:    · Plan of Care Reviewed with: patient    Subjective     "I need to rest" pt reports.   "I am ready to go home" pt states.     Occupational Profile:  Pt lives with daughter in one story home without steps to enter. Pt reports daughter is her caregiver and provides assist from toileting and dressing. Pt reports she has RW, w/c, shower chair and BSC but report she no longer uses that equipment.     Pain/Comfort:  · Pain Rating 1: 10/10  · Location 1: back  · Pain Addressed 1: Reposition, Distraction    Patients cultural, spiritual, Anabaptism conflicts given the current situation: no    Objective:     Communicated with: nsg prior to session.   Pt found supine in bed with 2 LPM. VSS throughout session with 's and oxygen >92%  General Precautions: Standard,   fall      Occupational Performance:  Co-tx completed this date to optimize functional performance given decreased " endurance for functional activity in ICU setting.     Bed Mobility:    Supine<>sit with MIN A        Activities of Daily Living:  · Pt demo strength/ROM and coordination to complete self feeding and supported sitting g/h skills with set-up  · Pt unable to engage in functional activity this date due to SOB and inability to be redirected from wanting a breathing treatment.     Cognitive/Visual Perceptual:  Pt awake, alert and oriented to self and place. Pt follows simple commands but does appear confused at times with impaired attention and problem solving skills.     Physical Exam  Pt demo WFL UE strength/ROM and coordination.     AMPAC 6 Click ADL:  AMPAC Total Score: 6    Treatment & Education:  Pt demo Poor to Fair sitting balance EOb approx 3 min. Pt with SOB but all vital signs remained stable. Pt declined further activity and returned supine.  Education provided re: OT POC and safety with functional mobility/ADL skills.     Patient left supine with all lines intact, call button in reach and nsg notified    GOALS:   Multidisciplinary Problems     Occupational Therapy Goals        Problem: Occupational Therapy Goal    Goal Priority Disciplines Outcome Interventions   Occupational Therapy Goal     OT, PT/OT Ongoing, Progressing    Description:  Goals to be met by: 7 days 6/5/2020     Patient will increase functional independence with ADLs by performing:    Pt to complete UE dressing with MIN A   Pt to complete seated g/h skills with set-up  Pt to complete t/f to commode with CGA  Pt to t/f to chair with CGA  Pt to tolerated OOB to chair x 3-4 hours daily to increase endurance for functional activity.                       History:     Past Medical History:   Diagnosis Date    AAA (abdominal aortic aneurysm)     Acid reflux     Arthritis     Cataract     Chronic pain     COPD (chronic obstructive pulmonary disease)     Coronary artery disease     stents    Diabetes mellitus     Diabetes mellitus, type 2      Fever blister     Hyperlipidemia     Hypertension     Kidney stone     Oxygen deficiency     uses O2 prn    Rheumatoid arteritis     UTI (urinary tract infection) 2018       Past Surgical History:   Procedure Laterality Date    CARDIAC SURGERY      CATARACT EXTRACTION      Bilateral    CHOLECYSTECTOMY      COLONOSCOPY      COLONOSCOPY N/A 5/23/2018    Procedure: COLONOSCOPY;  Surgeon: Thania Rodriguez MD;  [facility];  Service: Endoscopy;  Laterality: N/A;    CYSTOSCOPY W/ RETROGRADES Bilateral 10/11/2018    Procedure: CYSTOSCOPY WITH RETROGRADE PYELOGRAM;  Surgeon: Mike Pavon MD;  Location: [facility] OR;  Service: Urology;  Laterality: Bilateral;    CYSTOSCOPY W/ URETERAL STENT PLACEMENT Left 11/13/2019    Procedure: CYSTOSCOPY, WITH URETERAL STENT INSERTION;  Surgeon: Kody Lozano II, MD;  Location: [facility] OR;  Service: Urology;  Laterality: Left;    DILATION OF URETHRA N/A 10/11/2018    Procedure: DILATION-URETHRA;  Surgeon: Mike Pavon MD;  Location: [facility] OR;  Service: Urology;  Laterality: N/A;    ESOPHAGOGASTRODUODENOSCOPY      ESOPHAGOGASTRODUODENOSCOPY N/A 5/23/2018    Procedure: ESOPHAGOGASTRODUODENOSCOPY (EGD);  Surgeon: Thania Rodriguez MD;  Location: [facility];  Service: Endoscopy;  Laterality: N/A;    HYSTERECTOMY      KIDNEY STONE SURGERY      OOPHORECTOMY      PERICARDIOCENTESIS N/A 1/28/2020    Procedure: Pericardiocentesis;  Surgeon: Tommy Flannery MD;  Location: [facility] CATH LAB;  Service: Cardiovascular;  Laterality: N/A;    SPLENECTOMY, TOTAL         Time Tracking:     OT Date of Treatment: 05/29/20  OT Start Time: 0953  OT Stop Time: 1011  OT Total Time (min): 18 min    Billable Minutes:Evaluation 9  Therapeutic Activity 9    BRET León  5/29/2020

## 2020-05-29 NOTE — SUBJECTIVE & OBJECTIVE
Review of Systems   Unable to perform ROS: Dementia       Past Medical History:   Diagnosis Date    AAA (abdominal aortic aneurysm)     Acid reflux     Arthritis     Cataract     Chronic pain     COPD (chronic obstructive pulmonary disease)     Coronary artery disease     stents    Diabetes mellitus     Diabetes mellitus, type 2     Fever blister     Hyperlipidemia     Hypertension     Kidney stone     Oxygen deficiency     uses O2 prn    Rheumatoid arteritis     UTI (urinary tract infection) 2018       Past Surgical History:   Procedure Laterality Date    CARDIAC SURGERY      CATARACT EXTRACTION      Bilateral    CHOLECYSTECTOMY      COLONOSCOPY      COLONOSCOPY N/A 5/23/2018    Procedure: COLONOSCOPY;  Surgeon: Thania Rodriguez MD;  Location: [facility];  Service: Endoscopy;  Laterality: N/A;    CYSTOSCOPY W/ RETROGRADES Bilateral 10/11/2018    Procedure: CYSTOSCOPY WITH RETROGRADE PYELOGRAM;  Surgeon: Mike Pavon MD;  Location: [facility] OR;  Service: Urology;  Laterality: Bilateral;    CYSTOSCOPY W/ URETERAL STENT PLACEMENT Left 11/13/2019    Procedure: CYSTOSCOPY, WITH URETERAL STENT INSERTION;  Surgeon: Kody Lozano II, MD;  Location: [facility] OR;  Service: Urology;  Laterality: Left;    DILATION OF URETHRA N/A 10/11/2018    Procedure: DILATION-URETHRA;  Surgeon: Mike Pavon MD;  Location: [facility] OR;  Service: Urology;  Laterality: N/A;    ESOPHAGOGASTRODUODENOSCOPY      ESOPHAGOGASTRODUODENOSCOPY N/A 5/23/2018    Procedure: ESOPHAGOGASTRODUODENOSCOPY (EGD);  Surgeon: Thania Rodriguez MD;  Location: [facility];  Service: Endoscopy;  Laterality: N/A;    HYSTERECTOMY      KIDNEY STONE SURGERY      OOPHORECTOMY      PERICARDIOCENTESIS N/A 1/28/2020    Procedure: Pericardiocentesis;  Surgeon: Tommy Flannery MD;  Location: [facility] CATH LAB;  Service: Cardiovascular;  Laterality: N/A;    SPLENECTOMY, TOTAL         Family history of liver disease: No    Review of patient's  allergies indicates:   Allergen Reactions    Ace inhibitors Other (See Comments)    Amoxil [amoxicillin]      Does not remember what it did. Does not think it caused SOB.    Sulfa (sulfonamide antibiotics) Rash       Tobacco Use    Smoking status: Former Smoker     Packs/day: 0.25     Years: 25.00     Pack years: 6.25     Types: Cigarettes     Start date: 10/29/1957     Last attempt to quit: 1997     Years since quittin.9    Smokeless tobacco: Never Used    Tobacco comment: ex > 20yrs   Substance and Sexual Activity    Alcohol use: No    Drug use: No    Sexual activity: Not Currently       Medications Prior to Admission   Medication Sig Dispense Refill Last Dose    albuterol (PROVENTIL/VENTOLIN HFA) 90 mcg/actuation inhaler Inhale 1 puff into the lungs every 6 (six) hours as needed for Wheezing or Shortness of Breath. Rescue 1 Inhaler 1 Past Month    amoxicillin-clavulanate 875-125mg (AUGMENTIN) 875-125 mg per tablet Take 1 tablet by mouth 2 (two) times daily. 20 tablet 0 Past Month    aspirin 81 MG Chew Take 1 tablet (81 mg total) by mouth once daily. 1 tablet 0 Taking    azithromycin (Z-FIONA) 250 MG tablet Take 1 tablet (250 mg total) by mouth once daily. Take first 2 tablets together, then 1 every day until finished. 6 tablet 0 Past Month    carvediloL (COREG) 6.25 MG tablet Take 1 tablet (6.25 mg total) by mouth 2 (two) times daily. 60 tablet 0 Past Month    cetirizine (ZYRTEC) 10 MG tablet Take 1 tablet (10 mg total) by mouth once daily. (Patient not taking: Reported on 2020) 20 tablet 0 Not Taking    colchicine (COLCRYS) 0.6 mg tablet Take 1 tablet (0.6 mg total) by mouth once daily. 30 tablet 0 Past Month    esomeprazole (NEXIUM) 40 MG capsule Take 1 capsule (40 mg total) by mouth before breakfast. 30 capsule 11 Past Month    ferrous sulfate 325 (65 FE) MG EC tablet Take 1 tablet (325 mg total) by mouth once daily. 90 tablet 3 Past Month    fluticasone-salmeterol diskus inhaler  250-50 mcg INHALE 1 PUFF INTO THE LUNGS 2 TIMES A DAY 60 each 11 Past Month    folic acid (FOLVITE) 1 MG tablet TAKE 1 TABLET BY MOUTH EVERY DAY. MUST TAKE BECAUSE OF BEING ON METHOTREXATE 90 tablet 3 Past Month    furosemide (LASIX) 20 MG tablet Take 1 tablet (20 mg total) by mouth daily as needed (swelling or weight gain). 30 tablet 0 Past Month    gabapentin (NEURONTIN) 300 MG capsule TK 1 C PO TID  3 Past Month    hydroxychloroquine (PLAQUENIL) 200 mg tablet TAKE 1 TABLET BY MOUTH TWICE DAILY 180 tablet 3 Past Month    ibuprofen (ADVIL,MOTRIN) 600 MG tablet Take 1 tablet (600 mg total) by mouth every 8 (eight) hours as needed for Pain. 15 tablet 0 Past Month    losartan (COZAAR) 25 MG tablet Take 0.5 tablets (12.5 mg total) by mouth once daily. 45 tablet 3 Past Month    mirabegron (MYRBETRIQ) 25 mg Tb24 ER tablet Take 1 tablet (25 mg total) by mouth once daily. 30 tablet 0 Taking    multivitamin (ONE DAILY MULTIVITAMIN) per tablet Take 1 tablet by mouth once daily.   Past Month    nitroGLYCERIN (NITROSTAT) 0.4 MG SL tablet Place 1 tablet (0.4 mg total) under the tongue every 5 (five) minutes as needed for Chest pain. 20 tablet 3 Taking    ondansetron (ZOFRAN) 4 MG tablet Take 1 tablet (4 mg total) by mouth every 8 (eight) hours as needed for Nausea. 20 tablet 0 Past Month    polyethylene glycol (GLYCOLAX) 17 gram PwPk Take 17 g by mouth once daily. 100 each 6 Past Month    predniSONE (DELTASONE) 20 MG tablet Take 2 tabs po daily for 1 week and then 1.5 tabs po daily for 1 week and then 1 tab po daily. 60 tablet 0 Past Month    rosuvastatin (CRESTOR) 40 MG Tab Take 1 tablet (40 mg total) by mouth every evening. 90 tablet 3 Past Month    sertraline (ZOLOFT) 25 MG tablet Take 1 tablet (25 mg total) by mouth once daily. 30 tablet 11 Past Month    solifenacin (VESICARE) 5 MG tablet Take 1 tablet (5 mg total) by mouth once daily. 30 tablet 11 Past Month    topiramate (TOPAMAX) 25 MG tablet Take 25 mg  by mouth 2 (two) times daily.   Past Month       Objective:     Vital Signs (Most Recent):  Temp: 98.1 °F (36.7 °C) (05/29/20 1100)  Pulse: (!) 115 (05/29/20 1300)  Resp: (!) 28 (05/29/20 1300)  BP: 91/62 (05/29/20 1200)  SpO2: 98 % (05/29/20 1300) Vital Signs (24h Range):  Temp:  [97.1 °F (36.2 °C)-98.2 °F (36.8 °C)] 98.1 °F (36.7 °C)  Pulse:  [103-122] 115  Resp:  [16-32] 28  SpO2:  [92 %-100 %] 98 %  BP: ()/(61-87) 91/62     Weight: 59 kg (130 lb) (05/29/20 0843)  Body mass index is 25.39 kg/m².    Physical Exam   Constitutional: She appears lethargic. No distress.   HENT:   Head: Normocephalic.   Eyes: Conjunctivae are normal. No scleral icterus.   Neck: Normal range of motion. Neck supple.   Cardiovascular: Regular rhythm. Tachycardia present.   Pulmonary/Chest: Effort normal and breath sounds normal.   Abdominal: Soft. Bowel sounds are normal. She exhibits no distension and no mass. There is no tenderness. There is no rebound and no guarding.   Musculoskeletal: Normal range of motion.   Neurological: She appears lethargic.   Disoriented.  No asterixis on exam   Skin: Skin is warm and dry.       MELD-Na score: 31 at 5/29/2020  8:55 AM  MELD score: 28 at 5/29/2020  8:55 AM  Calculated from:  Serum Creatinine: 2.2 mg/dL at 5/29/2020  8:55 AM  Serum Sodium: 130 mmol/L at 5/29/2020  8:55 AM  Total Bilirubin: 1.7 mg/dL at 5/29/2020  8:55 AM  INR(ratio): 3.0 at 5/29/2020  8:55 AM  Age: 74 years    Significant Labs:  CBC:   Recent Labs   Lab 05/29/20  0855   WBC 16.03*   RBC 3.86*   HGB 8.9*   HCT 28.3*   PLT 93*     BMP:   Recent Labs   Lab 05/29/20  0855   GLU 91   *   K 4.5      CO2 15*   BUN 43*   CREATININE 2.2*   CALCIUM 8.5*     CMP:   Recent Labs   Lab 05/29/20  0855   GLU 91   CALCIUM 8.5*   ALBUMIN 2.5*   PROT 5.7*   *   K 4.5   CO2 15*      BUN 43*   CREATININE 2.2*   ALKPHOS 376*   ALT 1,343*   AST 1,867*   BILITOT 1.7*     Coagulation:   Recent Labs   Lab 05/28/20  6822  05/29/20  0855   INR  --  3.0*   APTT 29.3  --        Significant Imaging:  Labs: Reviewed  US: Reviewed  CT: Reviewed

## 2020-05-29 NOTE — PLAN OF CARE
CM met with patient at the bedside to discuss D/C POC needs. Patient AAO x's 3 and able to verify demographics in the chart are correct. CM name and contact number listed on the patient's white board.  CM provided explanation of discharge plan process. CM left blue folder at the bedside with explanation of qualification for placement and facility resources. Patient expressed understanding. CM remains available for any further patient needs or concerns.     Cathy Singleton, VALERIAP  [address] / [place] [address]      [id number] - [place], [place] - [address]  [address]  [place] [address]  Phone: [phone] Fax: [phone]    [facility] - [place] - [address]  [address]  [place] [address]  Phone: [phone] Fax: [phone]      Extended Emergency Contact Information  Primary Emergency Contact: Jermaine Mike [place] [address] [place]  Mobile Phone: [phone]  Relation: Daughter    Future Appointments   Date Time Provider Department Center   6/2/2020  6:00 AM [facility]   6/4/2020  1:15 PM Brandon Haley MD [facility] RHEUM [facility] ACT   6/9/2020  8:30 AM COVID TESTING, [facility] FAM [facility] FAM MED [facility] ACC   6/11/2020  9:00 AM PULMONARY FUNCTION LAB, [facility] PFS [facility]   6/12/2020 10:25 AM [facility] LAB [facility]   6/12/2020 11:30 AM Manan Rivera MD [facility] CARDIO [facility] 3RD FL   6/16/2020 10:00 AM Kym Shipman NP [facility] ONCOL [facility] ACC   6/16/2020  2:30 PM UROLOGY CLINIC [facility] UROLOGY [facility] GOLD       Payor: [employer] [facility] MEDICARE / Plan: [employer] [facility] HEALTH / Product Type: Medicare Advantage /          05/29/20 1503   Discharge Assessment   Assessment Type Discharge Planning Assessment   Confirmed/corrected address and phone number on facesheet? Yes   Assessment information obtained from? Patient   Prior to  hospitilization cognitive status: Alert/Oriented   Prior to hospitalization functional status: Independent   Current cognitive status: Alert/Oriented   Current Functional Status: Assistive Equipment   Facility Arrived From: [facility]   Lives With alone   Able to Return to Prior Arrangements other (see comments)  (TBD)   Is patient able to care for self after discharge? Unable to determine at this time (comments)   Who are your caregiver(s) and their phone number(s)? Ana Mike (dtr) [phone]   Patient currently being followed by outpatient case management? No   Patient currently receives any other outside agency services? No   Equipment Currently Used at Home none   Do you have any problems affording any of your prescribed medications? No   Is the patient taking medications as prescribed? yes   Does the patient have transportation home? Yes   Transportation Anticipated family or friend will provide   Discharge Plan A Home Health   Discharge Plan B Rehab   DME Needed Upon Discharge  other (see comments)  (TBD)       Noted PT recs for HH/PT.    Koko Stanley RN MSN  Critical Care-   Ext. [phone]

## 2020-05-29 NOTE — PROGRESS NOTES
Pharmacokinetic Initial Assessment: IV Vancomycin    Assessment/Plan:  · Received 1250 mg at outside hospital at 1519 on 5/28  · Currently in [facility]  · Ordered vancomycin random which resulted at 11.1 mcg/mL  · Give vancomycin 750 mg x 1 dose today  · Obtain random level tomorrow at 1200  · Redose if random level <20 mcg/mL    Please contact pharmacy at extension [phone] with any questions regarding this assessment.     Thank you for the consult,   Jeannette Mcdonald       Patient brief summary:  Sandra Draper is a 74 y.o. female initiated on antimicrobial therapy with IV Vancomycin for treatment of suspected lower respiratory infection    Drug Allergies:   Review of patient's allergies indicates:   Allergen Reactions    Ace inhibitors Other (See Comments)    Amoxil [amoxicillin]      Does not remember what it did. Does not think it caused SOB.    Sulfa (sulfonamide antibiotics) Rash       Actual Body Weight:   59 kg    Renal Function:   Estimated Creatinine Clearance: 17.5 mL/min (A) (based on SCr of 2.2 mg/dL (H)).,     Dialysis Method (if applicable):  N/A    CBC (last 72 hours):  Recent Labs   Lab Result Units 05/28/20  1303 05/28/20  2330 05/29/20  0210 05/29/20  0545 05/29/20  0855   WBC K/uL 16.91*  --  15.36* 16.30* 16.03*   Hemoglobin g/dL 10.2*  --  8.6* 8.5* 8.9*   Hemoglobin A1C %  --  5.8*  5.8*  --   --   --    Hematocrit % 32.6*  --  27.3* 27.8* 28.3*   Platelets K/uL SEE COMMENT  --  113* 83* 93*   Gran% % 90.0*  --  93.9* 93.8* 94.0*   Lymph% % 6.0*  --  0.0* 0.7* 0.0*   Mono% % 4.0  --  4.9 4.4 4.8   Eosinophil% % 0.0  --  0.3 0.2 0.1   Basophil% % 0.0  --  0.1 0.1 0.1   Differential Method  Manual  --  Automated Automated Automated       Metabolic Panel (last 72 hours):  Recent Labs   Lab Result Units 05/28/20  1303 05/28/20  1415 05/28/20  1515 05/28/20  1735 05/28/20  2333 05/29/20  0210 05/29/20  0855   Sodium mmol/L 133*  --  133* 132*  --   --  130*   Sodium Urine Random mmol/L  --   --   --   --   <20*  --   --    Potassium mmol/L 4.9  --  5.7* 4.7  --   --  4.5   Chloride mmol/L 102  --  105 105  --   --  103   CO2 mmol/L 15*  --  14* 15*  --   --  15*   Glucose mg/dL 88  --  88 138*  --   --  91   Glucose, UA   --  Negative  --   --   --   --   --    BUN, Bld mg/dL 43*  --  42* 42*  --   --  43*   Creatinine mg/dL 2.4*  --  2.3* 2.1*  --   --  2.2*   Creatinine, Random Ur mg/dL  --   --   --   --  76.0  76.0  --   --    Albumin g/dL 3.4*  --  3.0*  --   --  2.5* 2.5*   Total Bilirubin mg/dL 1.9*  --  1.6*  --   --  1.5* 1.7*   Alkaline Phosphatase U/L 488*  --  423*  --   --  375* 376*   AST U/L 2,299*  --  2,027*  --   --  1,644* 1,867*   ALT U/L 1,379*  --  1,240*  --   --  1,170* 1,343*   Magnesium mg/dL  --   --   --   --   --  1.8  --    Phosphorus mg/dL  --   --   --   --   --  3.0  --        Drug levels (last 3 results):  Recent Labs   Lab Result Units 05/29/20  0855   Vancomycin, Random ug/mL 11.1       Microbiologic Results:  Microbiology Results (last 7 days)     Procedure Component Value Units Date/Time    Blood culture [[id number]] Collected:  05/29/20 0008    Order Status:  Completed Specimen:  Blood from Peripheral, Foot, Left Updated:  05/29/20 0945     Blood Culture, Routine No Growth to date    Blood culture [[id number]] Collected:  05/29/20 0008    Order Status:  Completed Specimen:  Blood from Peripheral, Forearm, Right Updated:  05/29/20 0945     Blood Culture, Routine No Growth to date    Respiratory Viral Panel by PCR [facility]; Nasal Swab [[id number]] Collected:  05/29/20 0236    Order Status:  Sent Specimen:  Respiratory Updated:  05/29/20 0236    Culture, Respiratory with Gram Stain [[id number]]     Order Status:  No result Specimen:  Respiratory

## 2020-05-30 NOTE — ASSESSMENT & PLAN NOTE
At the day of presentation to [facility] patient is found with Acute Liver Injury, transaminases >1000s, acute kidney injury, concern for sepsis with leukocytosis, tachycardia, and elevated lactic acid     Plan:  - Obtain Liver Doppler - Sluggish main portal vein velocity suggestive of portal hypertension.  No evidence of portal vein thrombosis.  - Trend CMP, Lactic Acid, CBC, Coags, check fibrinogen  - Persistent lactic acidosis (5.1), acidotic to pH 7.289  - Trend blood gas to monitor status of acidosis if bicarbonate is worse or change in respiratory status  - TTE: EF 60%, indeterminate DD, interatrial septal aneurysm present w/ bulging to left, PA sys 47mmHg, CVP 15mmHg  - Diuresed w/ 80mg IV lasix x 2 - worsening RFP and LFTs   - Hold further diuretics at this time  - Hepatology Consultation, appreciate recs  - Continue patient on NAC infusion protocol  - Recent hepatitis panel negative  - CMV reactive  - Check CARMELA, Anti-Smooth Muscl, Anti-mitochon ab, IgGlevels, discuss further serologies with hepatology  - Continue Broad spectrum antibiotic coverage for patient with Vancomycin/Cefepime/metronidazole  - Strict I/O  - Ammonia - 78  - Start lactulose if concern for worsening encephalopathy

## 2020-05-30 NOTE — CARE UPDATE
05/30/2020    Updated patient's daughter, Megan, on patient status and plan of care. Daughter w/ medical background and expresses awareness of mother's many medical issues and progressive decline over the past few months. Multiple recent hospitalizations. Some disconnect between Megan and patient's niece, Ana, who lives with/cares for patient. Notes that patient is very reserved about medical conditions and neglects her health/misses medications regularly.     Detailed and lengthy discussion on patient's worsening mental status, liver dysfunction, kidney dysfunction, and cardiac issues. Emphasized that patient is very sick with multiple co morbidities. Specific etiology of Acute Liver Failure still unclear at this time w/ progressive multi-system organ dysfunction. Also relayed concern for possible cancer/malignancy in background w/ elevated CEA and . Daughter noted that patient's father had hx of colon cancer. She is unsure if patient's colonoscopy history (last colonoscopy 2018, hx of polyps, none seen on this c-scope). Explained current treatment for these different issues and noted that patient's multi-system organ dysfucntion is worsening despite these interventions. Daughter expressed understanding and emphasized that she would like patient to remain Full Code. Also discussed the possibility of a family meeting soon if patient's condition/status continues to decline. All questions answered satisfactorily. Will continue to provide updates daily as more information becomes available from pending studies and patients hospital course progresses.     Attempted to update patient's niece, Ana, as well this afternoon. No answer. Will attempt to call again later.    1843: Spoke with patient's niece, Ana, this evening to update her on patient status and plan of care. Emphasized the seriousness of the patients current conditions and reiterated the above mentioned concerns above. She expressed  understanding and was appreciative of the updates. All questions answered satisfactorily. Will continue to provide daily updates while in the ICU.    Tim Ferguson MD  Critical Care Medicine  PGY-1

## 2020-05-30 NOTE — ASSESSMENT & PLAN NOTE
Labs on presentation: Cr: 2.1 GFR: 22.4  Labs (5/19): Cr: 1.1 GFR: 48.9    Plan:  - Trial of 80mg IV lasix on 5/29 - good response  - 2nd dose of 80mg IV lasix overnight as patient SOB, symptoms improved  - sCr worsening (2.1 -> 2.6)  - Hold diuretics at this time  - Urine electrolytes (Na <20, Urea:516 and Cr: 76), UAR   - Daily weight  - Strict intake/output  - Adjust medications to GFR  - Avoid nephrotoxic medications  - Maintain MAP > 65  - Maintain Hb > 7

## 2020-05-30 NOTE — CONSULTS
Plan of care note     Sandra Draper is a 74 y.o. with CAD s/p PCI, COPD, T2DM, HLD HLN Rheumatoid arthritis, AAA, baseline dementia who has been admitted to ICU with acute liver injury and sepsis likely 2/2 LLL PNA. Cardiology consulted to comment on interatrial septal aneurysm, and diuresis in the setting of RV dysfunction and pulmonary hypertension noted on TTE today.     Interatrial septal aneurysm  - incidental finding on TTE 5/29/20  - can be associated with ASD or PFO. If there is clinical concern for ischemic stroke, then can consider ARTUR & bubble study.    Pulmonary hypertension  - h/o pericardial effusion s/p pericardiocentesis 1/28/20  - given evidence of RVE and RV dysfunction, PH likely progressing  - likely secondary to ILD (group 3) vs. chronic HFpEF (groe up 2)  - would try to optimize underlying etiologies  - CVP 15, if considering diuresis, would do so gently with close hemodynamic CVP, SVo2, CO/CI monitoring   - serial limited TTEs if hemodynamic collapse    Plan of care discussed with Dr. Ebonie Lorenz MD  Cardiology, PGY IV  Pager: [phone]

## 2020-05-30 NOTE — TREATMENT PLAN
Brief hepatology note:    Hepatitis panel sent and is negative. Low positive CARMELA titer. Neg smooth muscle ab, AMA and normal IgG level.    TTE with normal EF, elevated CVP    LFT's increased again with persistently high lactate.    Suspicion is high for liver ischemia, less likely DILI. Continue current management    -Will continue to follow her lab workup.  -Continue NAC infusion  -Supportive care with treatment of underlying shock

## 2020-05-30 NOTE — SUBJECTIVE & OBJECTIVE
Interval History/Significant Events:  Patient more lethargic this AM w/ worsening AMS. CTH ordered - No acute intracranial abnormalities. Worsening sCr (2.6), LFTs, INR (3.0), ammonia (78) and AMS (no longer oriented to self or city, but still following commands). Lactic Acid persistent (5.1). Remains acidotic (pH 7.289, CO2 29). Hold diuretics at this time 2/2 to worsening LFTs and renal function. CEA and  elevated at 64.3 and 157, respectively.     Review of Systems   Unable to perform ROS: Dementia     Objective:     Vital Signs (Most Recent):  Temp: 98.2 °F (36.8 °C) (05/30/20 1100)  Pulse: (!) 112 (05/30/20 1400)  Resp: 16 (05/30/20 1400)  BP: 101/75 (05/30/20 1400)  SpO2: 97 % (05/30/20 1400) Vital Signs (24h Range):  Temp:  [96.7 °F (35.9 °C)-98.2 °F (36.8 °C)] 98.2 °F (36.8 °C)  Pulse:  [109-116] 112  Resp:  [13-29] 16  SpO2:  [95 %-99 %] 97 %  BP: ()/(58-82) 101/75   Weight: 59 kg (130 lb)  Body mass index is 25.39 kg/m².      Intake/Output Summary (Last 24 hours) at 5/30/2020 1454  Last data filed at 5/30/2020 1417  Gross per 24 hour   Intake 690 ml   Output 3430 ml   Net -2740 ml       Physical Exam   Constitutional: She appears well-developed and well-nourished. She appears lethargic. No distress.   HENT:   Head: Normocephalic and atraumatic.   Eyes: Pupils are equal, round, and reactive to light. EOM are normal.   Neck: Normal range of motion. Neck supple. No hepatojugular reflux and no JVD present.   Cardiovascular: Regular rhythm, normal heart sounds and intact distal pulses. Tachycardia present.   No murmur heard.  Pulmonary/Chest: Effort normal. No respiratory distress. She has decreased breath sounds in the left lower field. She has no wheezes.   Abdominal: Soft. Bowel sounds are normal. There is tenderness in the left upper quadrant.   Musculoskeletal: Normal range of motion.   Neurological: She appears lethargic.   Lethargic, but easy to arouse  Patient no longer oriented to self or  city  Following commands  Moves all 4 extremities spontaneously   Skin: Skin is warm and dry. She is not diaphoretic.   Nursing note and vitals reviewed.      Vents:     Lines/Drains/Airways     Drain                 Ureteral Drain/Stent 11/13/19 0940 Left ureter 6 Fr. 199 days         Urethral Catheter 05/29/20 0010 1 day          Peripheral Intravenous Line                 Peripheral IV - Single Lumen 05/28/20 1303 22 G Left Forearm 2 days         Peripheral IV - Single Lumen 05/29/20 2015 20 G Left Antecubital less than 1 day              Significant Labs:    CBC/Anemia Profile:  Recent Labs   Lab 05/28/20  2330 05/29/20  0210  05/29/20  0855 05/29/20  1738 05/30/20  0820   WBC  --  15.36*   < > 16.03* 15.77* 14.59*   HGB  --  8.6*   < > 8.9* 8.5* 8.8*   HCT  --  27.3*   < > 28.3* 26.8* 28.1*   PLT  --  113*   < > 93* 95* 99*   MCV  --  75*   < > 73* 73* 74*   RDW  --  23.1*   < > 23.2* 23.1* 23.3*   IRON  --  16*  --   --   --   --    FERRITIN 155 147  --   --   --   --    FOLATE 27.1*  --   --   --   --   --    FIGLOLJO58 >2000*  --   --   --   --   --     < > = values in this interval not displayed.        Chemistries:  Recent Labs   Lab 05/29/20  0210 05/30/20  0530 05/30/20  0820 05/30/20  1130   NA  --    < > 134* 133* 133*   K  --    < > 4.2 3.9 3.8   CL  --    < > 104 104 103   CO2  --    < > 13* 12* 12*   BUN  --    < > 47* 47* 46*   CREATININE  --    < > 2.6* 2.5* 2.6*   CALCIUM  --    < > 8.7 8.9 8.5*   ALBUMIN 2.5*   < > 2.5* 2.5* 2.5*   PROT 5.6*   < > 5.7* 5.7* 5.8*   BILITOT 1.5*   < > 2.0* 2.0* 2.0*   ALKPHOS 375*   < > 391* 399* 407*   ALT 1,170*   < > 1,641* 1,683* 1,617*   AST 1,644*   < > 2,326* 2,349* 2,250*   MG 1.8  --  1.7  --  1.8   PHOS 3.0  --  4.3  --   --     < > = values in this interval not displayed.       ABGs:   Recent Labs   Lab 05/30/20  1147   PH 7.289*   PCO2 29.0*   HCO3 13.9*   POCSATURATED 73*   BE -13     Coagulation:   Recent Labs   Lab 05/28/20  2330  05/30/20  0820    INR  --    < > 3.0*   APTT 29.3  --   --     < > = values in this interval not displayed.     Lactic Acid:   Recent Labs   Lab 05/30/20  0159 05/30/20  0530 05/30/20  1130   LACTATE 5.4* 5.1* 4.9*     Lipid Panel:   Recent Labs   Lab 05/29/20  0210   CHOL 82*   HDL 20*   LDLCALC 47.2*   TRIG 74   CHOLHDL 24.4     All pertinent labs within the past 24 hours have been reviewed.    Significant Imaging:  I have reviewed all pertinent imaging results/findings within the past 24 hours.

## 2020-05-30 NOTE — PROGRESS NOTES
Pharmacokinetic Assessment Follow Up: IV Vancomycin    Vancomycin serum concentration assessment(s):    Vancomycin random level resulted at 14.4 mcg/mL approximately 21 hours after the previous dose. Goal level is 15 to 20 mcg/mL.     Patient is experiencing TRINITY but may be at plateau.     Drug levels (last 3 results):  Recent Labs   Lab Result Units 05/29/20  0855 05/30/20  1130   Vancomycin, Random ug/mL 11.1 14.4     Vancomycin Regimen Plan:    Administer vancomycin  mg once and redose when the random level is less than 20 mcg/mL. Next level to be drawn with morning labs on 5/31.    Pharmacy will continue to follow and monitor vancomycin.    Please contact pharmacy at extension [phone] for questions regarding this assessment.    Thank you for the consult,   Iris Mejia, PharmD, [facility]             Patient brief summary:  Sandra Draper is a 74 y.o. female initiated on antimicrobial therapy with IV vancomycin for treatment of lower respiratory infection    Drug Allergies:   Review of patient's allergies indicates:   Allergen Reactions    Ace inhibitors Other (See Comments)    Amoxil [amoxicillin]      Does not remember what it did. Does not think it caused SOB.    Sulfa (sulfonamide antibiotics) Rash     Actual Body Weight:   59 kg     Renal Function:   Estimated Creatinine Clearance: 14.8 mL/min (A) (based on SCr of 2.6 mg/dL (H)).    Dialysis Method (if applicable):  N/A    CBC (last 72 hours):  Recent Labs   Lab Result Units 05/28/20  1303 05/28/20  2330 05/29/20  0210 05/29/20  0545 05/29/20  0855 05/29/20  1738 05/30/20  0820   WBC K/uL 16.91*  --  15.36* 16.30* 16.03* 15.77* 14.59*   Hemoglobin g/dL 10.2*  --  8.6* 8.5* 8.9* 8.5* 8.8*   Hemoglobin A1C %  --  5.8*  5.8*  --   --   --   --   --    Hematocrit % 32.6*  --  27.3* 27.8* 28.3* 26.8* 28.1*   Platelets K/uL SEE COMMENT  --  113* 83* 93* 95* 99*   Gran% % 90.0*  --  93.9* 93.8* 94.0* 93.6* 91.2*   Lymph% % 6.0*  --  0.0* 0.7* 0.0* 0.0* 0.7*    Mono% % 4.0  --  4.9 4.4 4.8 5.5 6.9   Eosinophil% % 0.0  --  0.3 0.2 0.1 0.0 0.0   Basophil% % 0.0  --  0.1 0.1 0.1 0.1 0.1   Differential Method  Manual  --  Automated Automated Automated Automated Automated       Metabolic Panel (last 72 hours):  Recent Labs   Lab Result Units 05/28/20  1303 05/28/20  1415 05/28/20  1515 05/28/20  1735 05/28/20  2333 05/29/20  0210 05/29/20  0855 05/29/20  1552 05/30/20  0530 05/30/20  0820 05/30/20  1130   Sodium mmol/L 133*  --  133* 132*  --   --  130* 129* 134* 133* 133*   Sodium Urine Random mmol/L  --   --   --   --  <20*  --   --   --   --   --   --    Potassium mmol/L 4.9  --  5.7* 4.7  --   --  4.5 4.4 4.2 3.9 3.8   Chloride mmol/L 102  --  105 105  --   --  103 103 104 104 103   CO2 mmol/L 15*  --  14* 15*  --   --  15* 11* 13* 12* 12*   Glucose mg/dL 88  --  88 138*  --   --  91 115* 73 79 81   Glucose, UA   --  Negative  --   --   --   --   --   --   --   --   --    BUN, Bld mg/dL 43*  --  42* 42*  --   --  43* 42* 47* 47* 46*   Creatinine mg/dL 2.4*  --  2.3* 2.1*  --   --  2.2* 2.4* 2.6* 2.5* 2.6*   Creatinine, Random Ur mg/dL  --   --   --   --  76.0  76.0  --   --   --   --   --   --    Albumin g/dL 3.4*  --  3.0*  --   --  2.5* 2.5* 2.5* 2.5* 2.5* 2.5*   Total Bilirubin mg/dL 1.9*  --  1.6*  --   --  1.5* 1.7* 1.8* 2.0* 2.0* 2.0*   Alkaline Phosphatase U/L 488*  --  423*  --   --  375* 376* 393* 391* 399* 407*   AST U/L 2,299*  --  2,027*  --   --  1,644* 1,867* 2,259* 2,326* 2,349* 2,250*   ALT U/L 1,379*  --  1,240*  --   --  1,170* 1,343* 1,546* 1,641* 1,683* 1,617*   Magnesium mg/dL  --   --   --   --   --  1.8  --   --  1.7  --   --    Phosphorus mg/dL  --   --   --   --   --  3.0  --   --  4.3  --   --        Vancomycin Administrations:  vancomycin given in the last 96 hours                   vancomycin 750 mg in dextrose 5 % 250 mL IVPB (ready to mix system) (mg) 750 mg New Bag 05/29/20 1411    vancomycin 1.25 g in dextrose 5% 250 mL IVPB (ready to mix)  ()  Restarted 05/28/20 1614     1,250 mg New Bag  1519                Microbiologic Results:  Microbiology Results (last 7 days)     Procedure Component Value Units Date/Time    Blood culture [[id number]] Collected:  05/29/20 0008    Order Status:  Completed Specimen:  Blood from Peripheral, Forearm, Right Updated:  05/30/20 0612     Blood Culture, Routine No Growth to date      No Growth to date    Blood culture [[id number]] Collected:  05/29/20 0008    Order Status:  Completed Specimen:  Blood from Peripheral, Foot, Left Updated:  05/30/20 0612     Blood Culture, Routine No Growth to date      No Growth to date    Respiratory Viral Panel by PCR [facility]; Nasal Swab [[id number]] Collected:  05/29/20 0236    Order Status:  Sent Specimen:  Respiratory Updated:  05/29/20 0236    Culture, Respiratory with Gram Stain [[id number]]     Order Status:  No result Specimen:  Respiratory

## 2020-05-30 NOTE — PROGRESS NOTES
[facility]  Critical Care Medicine  Progress Note    Patient Name: Sandra Draper  MRN: [id number]  Admission Date: 5/28/2020  Hospital Length of Stay: 2 days  Code Status: Full Code  Attending Provider: Yamil Bain MD  Primary Care Provider: SAMI Mcpherson   Principal Problem: Acute liver failure    Subjective:     HPI:  75 y/o with medical issues of CAD s/p PCI (2015), HFpEF, RA on DMARD(HCQ and MTX), DM, HTN, HLD< COPD, AAA, CKD3, Dementia presented with productive cough and hemoptysis.  Non-specific symptoms of feeling warm and cold, no reported urinary symptoms or bowel symptoms, no change in mental status from baseline.      Symptoms started 1 day before presentation to [facility], no abdominal pain reported to family, pt cannot give history is oriented to self and hospital, new abdominal tenderness and left sided decreased breath sounds noted on examination.       Patient was recently admitted on 5/17-5/19 with concerns of burning pain under breast, with chest pain and LUQ pain.  She was noted on repeat ECHO to have new worsening EF 40-45%, segmental wall motion abnormalities, reported to be euvolemic, cardiology consulted and did not recommend LHC, she was started on COREG & LOSARTAN.  Discharge summary indicates MTX was discontinued.      At the day of transfer to [facility] patient is found with Acute Liver Injury, transaminases >1000s, acute kidney injury, concern for sepsis with leukocytosis, tachycardia, and elevated lactic acid and new c/o of productive cough with blood tinged sputum.  Patient started on treatment for sepsis and Abdominal ultrasound comments on hypoechoic masses and that main portal vein thrombosed w/o any biliary ductal dilatation.      Discussed with Dr. Valera and she confirmed with pts daughter re:code status and pt is full code, will initiate for transfer and      ED Course - s/p 30cc/kg sepsis bolus, to received vancomycin & levofloxacin, and Flagyl to be added.   Blood cultures drawn        Hospital/ICU Course:  Patient transferred to [facility] from [facility] on 5/28 for evaluation of Acute Liver Injury w/ AST and ALT >2000s. Tylenol level mildly elevated (13). Given 2 doses of NAC prior to transfer. Received 3rd NAC in [facility] ED and was started on NAC infusion in MICU. Patient also found to have acute kidney injury (2.4, baseline 1.0), concern for sepsis with leukocytosis, tachycardia, elevated lactic acid (5.6), BNP >1000, Troponin elevated (0.2), INR 2.6 and new c/o of productive cough with blood tinged sputum. Patient w/ dementia at baseline, following commands, AAOx2 (self and city), noted to be at baseline per family.  Patient started on treatment for sepsis w/ bs abx (Vanc/Cefepime/Flagyl) and Abdominal ultrasound comments on hypoechoic masses and that main portal vein thrombosed w/o any biliary ductal dilatation. Repeat Liver US w/ doppler at [facility] ruled out portal vein thrombosis, but noted sluggish flow. Administer 5mg IV Vitamin K followed by 10mg IV vitamin K for the next 2 days. Hepatology consulted, appreciate recs. TTE significant for EF 60%, indeterminate DD, interatrial septal aneurysm present w/ bulging to left, RV dysfunction, PA sys 47mmHg, CVP 15mmHg. Trial of 80mg IV lasix x 1. Good response overnight w/ 2800cc uop. 2nd dose administered overnight 2/2 SOB w/ improvement of symptoms. (5/30) Worsening sCr (2.6), LFTs, INR (3.0), ammonia (78) and AMS (no longer oriented to self or city, but still following commands). Lactic Acid persistent (5.1). Remains acidotic (pH 7.289, CO2 29). CTH ordered - No acute intracranial abnormalities noting sequela of chronic microvascular ischemic change, senescent change, and suspected remote infarcts involving the bilateral centrum semiovale. Hold diuretics at this time 2/2 to worsening LFTs and renal function. CEA and  elevated at 64.3 and 157, respectively.     Interval History/Significant Events:  Patient more  lethargic this AM w/ worsening AMS. CTH ordered - No acute intracranial abnormalities. Worsening sCr (2.6), LFTs, INR (3.0), ammonia (78) and AMS (no longer oriented to self or city, but still following commands). Lactic Acid persistent (5.1). Remains acidotic (pH 7.289, CO2 29). Hold diuretics at this time 2/2 to worsening LFTs and renal function. CEA and  elevated at 64.3 and 157, respectively.     Review of Systems   Unable to perform ROS: Dementia     Objective:     Vital Signs (Most Recent):  Temp: 98.2 °F (36.8 °C) (05/30/20 1100)  Pulse: (!) 112 (05/30/20 1400)  Resp: 16 (05/30/20 1400)  BP: 101/75 (05/30/20 1400)  SpO2: 97 % (05/30/20 1400) Vital Signs (24h Range):  Temp:  [96.7 °F (35.9 °C)-98.2 °F (36.8 °C)] 98.2 °F (36.8 °C)  Pulse:  [109-116] 112  Resp:  [13-29] 16  SpO2:  [95 %-99 %] 97 %  BP: ()/(58-82) 101/75   Weight: 59 kg (130 lb)  Body mass index is 25.39 kg/m².      Intake/Output Summary (Last 24 hours) at 5/30/2020 1454  Last data filed at 5/30/2020 1417  Gross per 24 hour   Intake 690 ml   Output 3430 ml   Net -2740 ml       Physical Exam   Constitutional: She appears well-developed and well-nourished. She appears lethargic. No distress.   HENT:   Head: Normocephalic and atraumatic.   Eyes: Pupils are equal, round, and reactive to light. EOM are normal.   Neck: Normal range of motion. Neck supple. No hepatojugular reflux and no JVD present.   Cardiovascular: Regular rhythm, normal heart sounds and intact distal pulses. Tachycardia present.   No murmur heard.  Pulmonary/Chest: Effort normal. No respiratory distress. She has decreased breath sounds in the left lower field. She has no wheezes.   Abdominal: Soft. Bowel sounds are normal. There is tenderness in the left upper quadrant.   Musculoskeletal: Normal range of motion.   Neurological: She appears lethargic.   Lethargic, but easy to arouse  Patient no longer oriented to self or city  Following commands  Moves all 4 extremities  spontaneously   Skin: Skin is warm and dry. She is not diaphoretic.   Nursing note and vitals reviewed.      Vents:     Lines/Drains/Airways     Drain                 Ureteral Drain/Stent 11/13/19 0940 Left ureter 6 Fr. 199 days         Urethral Catheter 05/29/20 0010 1 day          Peripheral Intravenous Line                 Peripheral IV - Single Lumen 05/28/20 1303 22 G Left Forearm 2 days         Peripheral IV - Single Lumen 05/29/20 2015 20 G Left Antecubital less than 1 day              Significant Labs:    CBC/Anemia Profile:  Recent Labs   Lab 05/28/20  2330 05/29/20  0210  05/29/20  0855 05/29/20  1738 05/30/20  0820   WBC  --  15.36*   < > 16.03* 15.77* 14.59*   HGB  --  8.6*   < > 8.9* 8.5* 8.8*   HCT  --  27.3*   < > 28.3* 26.8* 28.1*   PLT  --  113*   < > 93* 95* 99*   MCV  --  75*   < > 73* 73* 74*   RDW  --  23.1*   < > 23.2* 23.1* 23.3*   IRON  --  16*  --   --   --   --    FERRITIN 155 147  --   --   --   --    FOLATE 27.1*  --   --   --   --   --    [id number] >2000*  --   --   --   --   --     < > = values in this interval not displayed.        Chemistries:  Recent Labs   Lab 05/29/20  0210 05/30/20  0530 05/30/20  0820 05/30/20  1130   NA  --    < > 134* 133* 133*   K  --    < > 4.2 3.9 3.8   CL  --    < > 104 104 103   CO2  --    < > 13* 12* 12*   BUN  --    < > 47* 47* 46*   CREATININE  --    < > 2.6* 2.5* 2.6*   CALCIUM  --    < > 8.7 8.9 8.5*   ALBUMIN 2.5*   < > 2.5* 2.5* 2.5*   PROT 5.6*   < > 5.7* 5.7* 5.8*   BILITOT 1.5*   < > 2.0* 2.0* 2.0*   ALKPHOS 375*   < > 391* 399* 407*   ALT 1,170*   < > 1,641* 1,683* 1,617*   AST 1,644*   < > 2,326* 2,349* 2,250*   MG 1.8  --  1.7  --  1.8   PHOS 3.0  --  4.3  --   --     < > = values in this interval not displayed.       ABGs:   Recent Labs   Lab 05/30/20  1147   PH 7.289*   PCO2 29.0*   HCO3 13.9*   POCSATURATED 73*   BE -13     Coagulation:   Recent Labs   Lab 05/28/20  2330  05/30/20  0820   INR  --    < > 3.0*   APTT 29.3  --   --     < > =  values in this interval not displayed.     Lactic Acid:   Recent Labs   Lab 05/30/20  0159 05/30/20  0530 05/30/20  1130   LACTATE 5.4* 5.1* 4.9*     Lipid Panel:   Recent Labs   Lab 05/29/20  0210   CHOL 82*   HDL 20*   LDLCALC 47.2*   TRIG 74   CHOLHDL 24.4     All pertinent labs within the past 24 hours have been reviewed.    Significant Imaging:  I have reviewed all pertinent imaging results/findings within the past 24 hours.      ABG  Recent Labs   Lab 05/30/20  1147   PH 7.289*   PO2 42   PCO2 29.0*   HCO3 13.9*   BE -13     Assessment/Plan:     Psychiatric  Severe episode of recurrent major depressive disorder, without psychotic features  Continue home medication: Sertaline    Pulmonary  HAP (hospital-acquired pneumonia)  Patient presented with productive cough and hemoptysis. COVID negative    Plan:  - Continue Broad spectrum antibiotic coverage for patient with Vancomycin/Cefepime/metronidazole  - Fu/ cultures    COPD (chronic obstructive pulmonary disease)  Continue home medications: fluticasone furoate-vilanteroL     Duo neubs PRN    Productive cough  See HAP (Hospital Acquired Pneumonia)    Cardiac/Vascular  Chronic combined systolic and diastolic heart failure  Home medication: furosemide 20 mg OD daily  - euvolemic on exam    Plan:  - TTE (5/29): EF 60%, indeterminate DD, interatrial septal aneurysm present w/ bulging to left, PA sys 47mmHg, CVP 15mmHg  - Diuresed w/ 80mg IV lasix x 2 - worsening RFP and LFTs   - Hold further diuretics at this time    Coronary artery disease involving native coronary artery of native heart without angina pectoris  Continue home medication ASA    Stable angina  Continue home medication ASA    Essential hypertension  Home medications: Carvedilol and losartan    Anti-HTN were held due to borderline low BP   Will resume if needed    HLD (hyperlipidemia)  - Rosuvastatin held due to acute liver injury    Renal/  Acute kidney injury superimposed on chronic kidney disease  stage 3  Labs on presentation: Cr: 2.1 GFR: 22.4  Labs (5/19): Cr: 1.1 GFR: 48.9    Plan:  - Trial of 80mg IV lasix on 5/29 - good response  - 2nd dose of 80mg IV lasix overnight as patient SOB, symptoms improved  - sCr worsening (2.1 -> 2.6)  - Hold diuretics at this time  - Urine electrolytes (Na <20, Urea:516 and Cr: 76), UAR   - Daily weight  - Strict intake/output  - Adjust medications to GFR  - Avoid nephrotoxic medications  - Maintain MAP > 65  - Maintain Hb > 7    Endocrine  Diabetes mellitus type 2 without retinopathy  Home medication review: no anti-DM listed    - HbA1c  - POCT glucose Q 6 hours  - LDISS for NPO patient   - Monitor glucose level and adjust insulin accordingly    GI  * Acute liver failure  At the day of [place] [facility] [place] is found with Acute Liver Injury, transaminases >1000s, acute kidney injury, concern for sepsis with leukocytosis, tachycardia, and elevated lactic acid     Plan:  - Obtain Liver Doppler - Sluggish main portal vein velocity suggestive of portal hypertension.  No evidence of portal vein thrombosis.  - Trend CMP, Lactic Acid, CBC, Coags, check fibrinogen  - Persistent lactic acidosis (5.1), acidotic to pH 7.289  - Trend blood gas to monitor status of acidosis if bicarbonate is worse or change in respiratory status  - TTE: EF 60%, indeterminate DD, interatrial septal aneurysm present w/ bulging to left, PA sys 47mmHg, CVP 15mmHg  - Diuresed w/ 80mg IV lasix x 2 - worsening RFP and LFTs   - Hold further diuretics at this time  - Hepatology Consultation, appreciate recs  - Continue patient on NAC infusion protocol  - Recent hepatitis panel negative  - CMV reactive  - Check CARMELA, Anti-Smooth Muscl, Anti-mitochon ab, IgGlevels, discuss further serologies with hepatology  - Continue Broad spectrum antibiotic coverage for patient with Vancomycin/Cefepime/metronidazole  - Strict I/O  - Ammonia - 78  - Start lactulose if concern for worsening encephalopathy    Acute liver  disease  Acute liver failure    GERD (gastroesophageal reflux disease)  Pantoprazole 40 mg IV daily    Orthopedic  Rheumatoid arthritis involving both hips  -- See RA    RA (rheumatoid arthritis)  On DMARDS (HCQ and MTX). Last discharge summary indicates MTX was discontinued.       Other  Long-term use of Plaquenil  -- See RA       Critical Care Daily Checklist:    A: Awake: RASS Goal/Actual Goal: RASS Goal: 0-->alert and calm  Actual: Peoples Agitation Sedation Scale (RASS): Alert and calm   B: Spontaneous Breathing Trial Performed?     C: SAT & SBT Coordinated?  NA                      D: Delirium: CAM-ICU Overall CAM-ICU: Negative   E: Early Mobility Performed? Yes   F: Feeding Goal:    Status:     Current Diet Order   Procedures    Diet NPO Except for: Medication, Sips with Medication     Order Specific Question:   Except for     Answer:   Medication     Order Specific Question:   Except for     Answer:   Sips with Medication      AS: Analgesia/Sedation NA   T: Thromboembolic Prophylaxis Deferred 2/2 hemoptysis. Start heparin ppx when appropriate   H: HOB > 300 Yes   U: Stress Ulcer Prophylaxis (if needed) PPI   G: Glucose Control SSI   B: Bowel Function Stool Occurrence: 0   I: Indwelling Catheter (Lines & Flaherty) Necessity Assessed   D: De-escalation of Antimicrobials/Pharmacotherapies Continue BS abx    Plan for the day/ETD CTH, continue NAC, call family    Code Status:  Family/Goals of Care: Full Code         Critical secondary to Patient has a condition that poses threat to life and bodily function: Acute Liver Failure      Critical care was time spent personally by me on the following activities: development of treatment plan with patient or surrogate and bedside caregivers, discussions with consultants, evaluation of patient's response to treatment, examination of patient, ordering and performing treatments and interventions, ordering and review of laboratory studies, ordering and review of radiographic  studies, pulse oximetry, re-evaluation of patient's condition. This critical care time did not overlap with that of any other provider or involve time for any procedures.     Tim Ferguson MD  Critical Care Medicine  [facility]

## 2020-05-30 NOTE — ASSESSMENT & PLAN NOTE
Home medication: furosemide 20 mg OD daily  - euvolemic on exam    Plan:  - TTE (5/29): EF 60%, indeterminate DD, interatrial septal aneurysm present w/ bulging to left, PA sys 47mmHg, CVP 15mmHg  - Diuresed w/ 80mg IV lasix x 2 - worsening RFP and LFTs   - Hold further diuretics at this time

## 2020-05-30 NOTE — ASSESSMENT & PLAN NOTE
Patient presented with productive cough and hemoptysis. COVID negative    Plan:  - Continue Broad spectrum antibiotic coverage for patient with Vancomycin/Cefepime/metronidazole  - Fu/ cultures

## 2020-05-30 NOTE — CONSULTS
"Placed 18g X 8cm midline in right brachial vein of RUE using realtime ultrasound guidance. Lot#[id number]. Remove on or before 06/28/2020. 20g 1 3/4" PIV placed to RFA using ultrasound guidance.  "

## 2020-05-30 NOTE — HOSPITAL COURSE
Patient transferred to [facility] from [facility] on 5/28 for evaluation of Acute Liver Injury w/ AST and ALT >2000s. Tylenol level mildly elevated (13). Given 2 doses of NAC prior to transfer. Received 3rd NAC in [facility] ED and was started on NAC infusion in MICU. Patient also found to have acute kidney injury (2.4, baseline 1.0), concern for sepsis with leukocytosis, tachycardia, elevated lactic acid (5.6), BNP >1000, Troponin elevated (0.2), INR 2.6 and new c/o of productive cough with blood tinged sputum. Patient w/ dementia at baseline, following commands, AAOx2 (self and city), noted to be at baseline per family.  Patient started on treatment for sepsis w/ bs abx (Vanc/Cefepime/Flagyl) and Abdominal ultrasound comments on hypoechoic masses and that main portal vein thrombosed w/o any biliary ductal dilatation. Repeat Liver US w/ doppler at [facility] ruled out portal vein thrombosis, but noted sluggish flow. Administer 5mg IV Vitamin K followed by 10mg IV vitamin K for the next 2 days. Hepatology consulted, appreciate recs. TTE significant for EF 60%, indeterminate DD, interatrial septal aneurysm present w/ bulging to left, RV dysfunction, PA sys 47mmHg, CVP 15mmHg. Trial of 80mg IV lasix x 1. Good response overnight w/ 2800cc uop. 2nd dose administered overnight 2/2 SOB w/ improvement of symptoms. (5/30) Worsening sCr (2.6), LFTs, INR (3.0), ammonia (78) and AMS (no longer oriented to self or city, but still following commands). Lactic Acid persistent (5.1). Remains acidotic (pH 7.289, CO2 29). CTH ordered - No acute intracranial abnormalities noting sequela of chronic microvascular ischemic change, senescent change, and suspected remote infarcts involving the bilateral centrum semiovale. Hold diuretics at this time 2/2 to worsening LFTs and renal function. CEA and  elevated at 64.3 and 157, respectively. (05/31) 10:50AM: Patient in Afib w/ RVR w/ HR 160s. /61. Good response to IV metoprolol yesterday. Team  present in room. 5mg IV metoprolol administered. HR improved, however, patient became unresponsive. Coded in room. Epinephrine administered. One round of CPR w/ return of spontaneous circulation. Patient emergently intubated w/ RSI by Crit care fellow. Started on peripheral levophed gtt. Sedation w/ propofol gtt. Right IJ trialysis placed by Crirt care fellow. R radial arterial line placed by resident. Low MAPs. Increasing pressor requirements. Add vasopressin. Daughter, Megan, updated of patient's critical condition and has elected to continue all aggressive measures. Remains Full Code. 12:45PM: Critical Care Medicine called back to patient's bedside. VTach on monitor. Pulseless. CPR initiated. 2 rounds CPR. 2 Epinephrine. 3 Bicarb. 1 Amiodarone. Return of spontaneous circulation. Some blood noted in ETT 2/2 chest compressions. Daughter notified, wants patient to remain Full Code. Advised Megan to come to hospital to see patient and meet with CCM team for GOC discussion. Patient is critically ill with poor prognosis. 3:18PM: Patient's daughter, Megan, at bedside. After further discussion noting continued decline of patient on maximum support, she has decided to to make patient DNR. Will continue to support patient as best we can while other family members come to visit. Plan to withdraw life support measures once family has visited. Called to bedside by patient's nurse. Nursing supervisor notified. Family at bedside.  has been called and is also at bedside. Patient is not responding to verbal or tactile stimuli. Patient does not have a papillary or corneal reflex. Her pupils are fixed and dilated. No heart or breath sounds on auscultation. No respirations. No palpable pulses. Time of death 16:45. Cause of Death: Cardiovascular collapse secondary to multi-system organ dysfunction and shock (septic vs cardiogenic).

## 2020-05-31 PROBLEM — R57.0: Status: ACTIVE | Noted: 2020-01-01

## 2020-05-31 PROBLEM — Z51.5 COMFORT MEASURES ONLY STATUS: Status: ACTIVE | Noted: 2020-01-01

## 2020-05-31 NOTE — SUBJECTIVE & OBJECTIVE
OK for order for creatinine     Also referrals placed  Lazaro Gupta MD    Past Medical History:   Diagnosis Date    AAA (abdominal aortic aneurysm)     Acid reflux     Arthritis     Cataract     Chronic pain     COPD (chronic obstructive pulmonary disease)     Coronary artery disease     stents    Diabetes mellitus     Diabetes mellitus, type 2     Fever blister     Hyperlipidemia     Hypertension     Kidney stone     Oxygen deficiency     uses O2 prn    Rheumatoid arteritis     UTI (urinary tract infection) 2018       Past Surgical History:   Procedure Laterality Date    CARDIAC SURGERY      CATARACT EXTRACTION      Bilateral    CHOLECYSTECTOMY      COLONOSCOPY      COLONOSCOPY N/A 5/23/2018    Procedure: COLONOSCOPY;  Surgeon: Thania Rodriguez MD;  Location: [facility];  Service: Endoscopy;  Laterality: N/A;    CYSTOSCOPY W/ RETROGRADES Bilateral 10/11/2018    Procedure: CYSTOSCOPY WITH RETROGRADE PYELOGRAM;  Surgeon: Mike Pavon MD;  Location: [facility];  Service: Urology;  Laterality: Bilateral;    CYSTOSCOPY W/ URETERAL STENT PLACEMENT Left 11/13/2019    Procedure: CYSTOSCOPY, WITH URETERAL STENT INSERTION;  Surgeon: Kody Lozano II, MD;  Location: [facility] OR;  Service: Urology;  Laterality: Left;    DILATION OF URETHRA N/A 10/11/2018    Procedure: DILATION-URETHRA;  Surgeon: Mike Pavon MD;  Location: [facility] OR;  Service: Urology;  Laterality: N/A;    ESOPHAGOGASTRODUODENOSCOPY      ESOPHAGOGASTRODUODENOSCOPY N/A 5/23/2018    Procedure: ESOPHAGOGASTRODUODENOSCOPY (EGD);  Surgeon: Thania Rodriguez MD;  Location: [facility];  Service: Endoscopy;  Laterality: N/A;    HYSTERECTOMY      KIDNEY STONE SURGERY      OOPHORECTOMY      PERICARDIOCENTESIS N/A 1/28/2020    Procedure: Pericardiocentesis;  Surgeon: Tommy Flannery MD;  Location: [facility] CATH LAB;  Service: Cardiovascular;  Laterality: N/A;    SPLENECTOMY, TOTAL         Review of patient's allergies indicates:   Allergen Reactions    Ace inhibitors Other (See Comments)    Amoxil  [amoxicillin]      Does not remember what it did. Does not think it caused SOB.    Sulfa (sulfonamide antibiotics) Rash     Current Facility-Administered Medications   Medication Frequency    acetylcysteine 20% (200 mg/ml) (ACETADOTE) 12,000 mg in dextrose 5 % 500 mL infusion Continuous    albuterol-ipratropium 2.5 mg-0.5 mg/3 mL nebulizer solution 3 mL Q4H PRN    ceFEPIme injection 2 g Q24H    dextrose 10% (D10W) Bolus PRN    ferrous sulfate EC tablet 325 mg Daily    fluticasone furoate-vilanteroL 100-25 mcg/dose diskus inhaler 1 puff Daily    folic acid tablet 1 mg Daily    glucagon (human recombinant) injection 1 mg PRN    hydrocortisone sodium succinate injection 100 mg Q8H    insulin aspart U-100 pen 0-5 Units Q6H PRN    lactulose 20 gram/30 mL solution Soln 10 g TID    lidocaine 5 % patch 1 patch Q24H    metoprolol injection 5 mg PRN    metronidazole IVPB 500 mg Q8H    ondansetron injection 4 mg Q8H PRN    pantoprazole injection 40 mg Daily    phytonadione vitamin k (AQUA-MEPHYTON) 10 mg in dextrose 5 % 50 mL IVPB Daily    promethazine tablet 25 mg Q6H PRN    sodium chloride 0.9% flush 10 mL PRN    vancomycin - pharmacy to dose pharmacy to manage frequency     Family History     Problem Relation (Age of Onset)    Alzheimer's disease Sister, Sister    Breast cancer Sister    Cancer Sister, Brother    Colon cancer Sister    Diabetes Mother, Father    Hypertension Mother, Father    Kidney disease Sister    Lung cancer Brother    Rheum arthritis Mother, Sister, Sister    Skin cancer Brother    Stroke Mother, Father        Tobacco Use    Smoking status: Former Smoker     Packs/day: 0.25     Years: 25.00     Pack years: 6.25     Types: Cigarettes     Start date: 10/29/1957     Last attempt to quit: 1997     Years since quittin.9    Smokeless tobacco: Never Used    Tobacco comment: ex > 20yrs   Substance and Sexual Activity    Alcohol use: No    Drug use: No    Sexual activity:  Not Currently     Review of Systems   Unable to perform ROS: Mental status change   Constitutional: Positive for activity change.     Objective:     Vital Signs (Most Recent):  Temp: 96.7 °F (35.9 °C) (05/31/20 0700)  Pulse: 104 (05/31/20 0900)  Resp: (!) 29 (05/31/20 0900)  BP: 98/61 (05/31/20 0900)  SpO2: (!) 92 % (05/31/20 0900)  O2 Device (Oxygen Therapy): nasal cannula (05/31/20 0900) Vital Signs (24h Range):  Temp:  [96.7 °F (35.9 °C)-98.2 °F (36.8 °C)] 96.7 °F (35.9 °C)  Pulse:  [] 104  Resp:  [13-49] 29  SpO2:  [92 %-98 %] 92 %  BP: ()/(47-76) 98/61     Weight: 59 kg (130 lb) (05/29/20 0843)  Body mass index is 25.39 kg/m².  Body surface area is 1.58 meters squared.    I/O last 3 completed shifts:  In: 1425 [P.O.:50; I.V.:225; IV Piggyback:1150]  Out: 3305 [Urine:3305]    Physical Exam   Constitutional: She appears well-developed and well-nourished. She appears lethargic. No distress.   HENT:   Head: Normocephalic and atraumatic.   Eyes: Pupils are equal, round, and reactive to light. EOM are normal.   Neck: Normal range of motion. Neck supple. No hepatojugular reflux and no JVD present.   Cardiovascular: Regular rhythm, normal heart sounds and intact distal pulses. Tachycardia present.   No murmur heard.  Pulmonary/Chest: Effort normal. No respiratory distress. She has decreased breath sounds in the left lower field. She has no wheezes.   Abdominal: Soft. Bowel sounds are normal. There is no tenderness.   Musculoskeletal: Normal range of motion.   Neurological: She appears lethargic.   Does not respond to questions    Skin: Skin is warm and dry. She is not diaphoretic.   Nursing note and vitals reviewed.      Significant Labs:  CBC:   Recent Labs   Lab 05/31/20  0245   WBC 14.46*   RBC 3.55*   HGB 8.2*   HCT 25.4*   PLT 91*   MCV 72*   MCH 23.1*   MCHC 32.3     CMP:   Recent Labs   Lab 05/31/20  0630 05/31/20  0803   GLU 74 239*   CALCIUM 8.4* 7.8*   ALBUMIN 2.5* 2.2*   PROT 5.7* 5.3*     132*   K 4.4 4.4   CO2 10* 10*     --    BUN 53* 49*   CREATININE 2.9* 2.7*   ALKPHOS 385* 343*   ALT 1,680* 1,498*   AST 2,139* 1,865*   BILITOT 2.1* 1.7*     Recent Labs   Lab 05/30/20 2006   COLORU Yellow   SPECGRAV 1.010   PHUR 5.0   PROTEINUA 1+*   BACTERIA Moderate*   NITRITE Negative   LEUKOCYTESUR 1+*   HYALINECASTS 29*     All labs within the past 24 hours have been reviewed.    Significant Imaging:  Labs: Reviewed  X-Ray: Reviewed  US: Reviewed  CT: Reviewed

## 2020-05-31 NOTE — ASSESSMENT & PLAN NOTE
Please see Hospital course for details.    Time of death 16:45    Cause of Death: Cardiovascular collapse secondary to multi-system organ dysfunction and shock (septic vs cardiogenic).

## 2020-05-31 NOTE — PROGRESS NOTES
VANCOMYCIN DOSING BY PHARMACY DISCONTINUATION NOTE    Sandra Draper is a 74 y.o. female who had been consulted for vancomycin dosing.    The pharmacy consult for vancomycin dosing has been discontinued.     Vancomycin Dosing by Pharmacy Consult will sign-off. Please reconsult if necessary. Thank you for allowing us to participate in this patient's care.       Shilpa Cox, PharmD  [phone]

## 2020-05-31 NOTE — RESPIRATORY THERAPY
Pt coded at bedside and bagged with ETCO2 monitor. Decision made to intubate patient. Pt tolerated intubation well and placed on mechanical ventilation at documented settings.

## 2020-05-31 NOTE — ASSESSMENT & PLAN NOTE
At the day of presentation to [facility] patient is found with Acute Liver Injury, transaminases >1000s, acute kidney injury, concern for sepsis with leukocytosis, tachycardia, and elevated lactic acid     Plan:  - Obtain Liver Doppler - Sluggish main portal vein velocity suggestive of portal hypertension.  No evidence of portal vein thrombosis.  - Trend CMP, Lactic Acid, CBC, Coags, check fibrinogen  - Persistent lactic acidosis 7.6  - Trend blood gas to monitor status of acidosis if bicarbonate is worse or change in respiratory status  - TTE: EF 60%, indeterminate DD, interatrial septal aneurysm present w/ bulging to left, PA sys 47mmHg, CVP 15mmHg  - Diuresed w/ 80mg IV lasix x 2 - worsening RFP and LFTs   - Hold further diuretics at this time  - Hepatology Consultation, appreciate recs  - Continue patient on NAC infusion protocol  - Recent hepatitis panel negative  - Check CARMELA, Anti-Smooth Muscl, Anti-mitochon ab, IgGlevels, discuss further serologies with hepatology  - Continue Broad spectrum antibiotic coverage for patient with Vancomycin/Cefepime/metronidazole  - Strict I/O  - Ammonia - 78  - Start lactulose if concern for worsening encephalopathy

## 2020-05-31 NOTE — SIGNIFICANT EVENT
Critical Care Medicine                                                              Death Note      Called to bedside by patient's nurse. Nursing supervisor notified. Family at bedside.  has been called and is also at bedside.    Patient is not responding to verbal or tactile stimuli. Patient does not have a papillary or corneal reflex. Her pupils are fixed and dilated. No heart or breath sounds on auscultation. No respirations. No palpable pulses.     Time of death 16:45.      Cause of Death: Cardiovascular collapse secondary to multi-system organ dysfunction and shock (septic vs cardiogenic).    Tim Ferguson MD  Internal Medicine  [facility] Email: [email or web address]

## 2020-05-31 NOTE — PROGRESS NOTES
Pharmacokinetic Assessment Follow Up: IV Vancomycin    Vancomycin serum concentration assessment(s):    Vancomycin random level resulted at 19 mcg/mL approximately 16 hours after the previous dose. Goal level is 15 to 20 mcg/mL.     Patient is experiencing TRINITY but may be slowly recovering.    Drug levels (last 3 results):  Recent Labs   Lab Result Units 05/29/20  0855 05/30/20  1130 05/31/20  0630   Vancomycin, Random ug/mL 11.1 14.4 19.0     Vancomycin Regimen Plan:    Schedule vancomycin  mg every 24 hours. Next level to be drawn 6/2 1230 prior to 3rd dose of new regimen.    Pharmacy will continue to follow and monitor vancomycin.    Please contact pharmacy at extension [phone] for questions regarding this assessment.    Thank you for the consult,   Iris Mejia, PharmD, [facility]             Patient brief summary:  Sandra Draper is a 74 y.o. female initiated on antimicrobial therapy with IV vancomycin for treatment of lower respiratory infection    Drug Allergies:   Review of patient's allergies indicates:   Allergen Reactions    Ace inhibitors Other (See Comments)    Amoxil [amoxicillin]      Does not remember what it did. Does not think it caused SOB.    Sulfa (sulfonamide antibiotics) Rash     Actual Body Weight:   59 kg     Renal Function:   Estimated Creatinine Clearance: 14.2 mL/min (A) (based on SCr of 2.7 mg/dL (H)).    Dialysis Method (if applicable):  N/A    CBC (last 72 hours):  Recent Labs   Lab Result Units 05/28/20  1303 05/28/20  2330 05/29/20  0210 05/29/20  0545 05/29/20  0855 05/29/20  1738 05/30/20  0820 05/31/20  0245   WBC K/uL 16.91*  --  15.36* 16.30* 16.03* 15.77* 14.59* 14.46*   Hemoglobin g/dL 10.2*  --  8.6* 8.5* 8.9* 8.5* 8.8* 8.2*   Hemoglobin A1C %  --  5.8*  5.8*  --   --   --   --   --   --    Hematocrit % 32.6*  --  27.3* 27.8* 28.3* 26.8* 28.1* 25.4*   Platelets K/uL SEE COMMENT  --  113* 83* 93* 95* 99* 91*   Gran% % 90.0*  --  93.9* 93.8* 94.0* 93.6* 91.2* 91.2*   Lymph%  % 6.0*  --  0.0* 0.7* 0.0* 0.0* 0.7* 0.5*   Mono% % 4.0  --  4.9 4.4 4.8 5.5 6.9 7.2   Eosinophil% % 0.0  --  0.3 0.2 0.1 0.0 0.0 0.0   Basophil% % 0.0  --  0.1 0.1 0.1 0.1 0.1 0.1   Differential Method  Manual  --  Automated Automated Automated Automated Automated Automated       Metabolic Panel (last 72 hours):  Recent Labs   Lab Result Units 05/28/20  1303 05/28/20  1415 05/28/20  1515 05/28/20  1735 05/28/20  2333 05/29/20  0210 05/29/20  0855 05/29/20  1552 05/30/20  0530 05/30/20  0820 05/30/20  1130 05/30/20  1510 05/30/20  1843 05/30/20  2006 05/30/20  2300 05/31/20  0245 05/31/20  0630 05/31/20  0803   Sodium mmol/L 133*  --  133* 132*  --   --  130* 129* 134* 133* 133* 135* 134*  --  134*  --  136 132*   Sodium Urine Random mmol/L  --   --   --   --  <20*  --   --   --   --   --   --   --   --   --   --   --   --   --    Potassium mmol/L 4.9  --  5.7* 4.7  --   --  4.5 4.4 4.2 3.9 3.8 3.8 3.7  --  3.6  --  4.4 4.4   Chloride mmol/L 102  --  105 105  --   --  103 103 104 104 103 105 104  --  103  --  105  --    CO2 mmol/L 15*  --  14* 15*  --   --  15* 11* 13* 12* 12* 13* 12*  --  13*  --  10* 10*   Glucose mg/dL 88  --  88 138*  --   --  91 115* 73 79 81 76 95  --  86  --  74 239*   Glucose, UA   --  Negative  --   --   --   --   --   --   --   --   --   --   --  Negative  --   --   --   --    BUN, Bld mg/dL 43*  --  42* 42*  --   --  43* 42* 47* 47* 46* 48* 49*  --  50*  --  53* 49*   Creatinine mg/dL 2.4*  --  2.3* 2.1*  --   --  2.2* 2.4* 2.6* 2.5* 2.6* 2.8* 2.8*  --  2.7*  --  2.9* 2.7*   Creatinine, Random Ur mg/dL  --   --   --   --  76.0  76.0  --   --   --   --   --   --   --   --   --   --   --   --   --    Albumin g/dL 3.4*  --  3.0*  --   --  2.5* 2.5* 2.5* 2.5* 2.5* 2.5* 2.5*  --   --  2.4*  --  2.5* 2.2*   Total Bilirubin mg/dL 1.9*  --  1.6*  --   --  1.5* 1.7* 1.8* 2.0* 2.0* 2.0* 1.9*  --   --  1.9*  --  2.1* 1.7*   Alkaline Phosphatase U/L 488*  --  423*  --   --  375* 376* 393* 391* 399*  407* 388*  --   --  382*  --  385* 343*   AST U/L 2,299*  --  2,027*  --   --  1,644* 1,867* 2,259* 2,326* 2,349* 2,250* 2,278*  --   --  2,215*  --  2,139* 1,865*   ALT U/L 1,379*  --  1,240*  --   --  1,170* 1,343* 1,546* 1,641* 1,683* 1,617* 1,651*  --   --  1,659*  --  1,680* 1,498*   Magnesium mg/dL  --   --   --   --   --  1.8  --   --  1.7  --  1.8  --   --   --   --  1.8  --   --    Phosphorus mg/dL  --   --   --   --   --  3.0  --   --  4.3  --   --   --   --   --   --  4.6*  --   --        Vancomycin Administrations:  vancomycin given in the last 96 hours                   vancomycin 750 mg in dextrose 5 % 250 mL IVPB (ready to mix system) (mg) 750 mg New Bag 05/29/20 1411    vancomycin 1.25 g in dextrose 5% 250 mL IVPB (ready to mix) ()  Restarted 05/28/20 1614     1,250 mg New Bag  1519                Microbiologic Results:  Microbiology Results (last 7 days)     Procedure Component Value Units Date/Time    Blood culture [[id number]] Collected:  05/29/20 0008    Order Status:  Completed Specimen:  Blood from Peripheral, Forearm, Right Updated:  05/31/20 0612     Blood Culture, Routine No Growth to date      No Growth to date      No Growth to date    Blood culture [[id number]] Collected:  05/29/20 0008    Order Status:  Completed Specimen:  Blood from Peripheral, Foot, Left Updated:  05/31/20 0612     Blood Culture, Routine No Growth to date      No Growth to date      No Growth to date    Culture, Respiratory with Gram Stain [[id number]] Collected:  05/30/20 1718    Order Status:  Completed Specimen:  Respiratory from Sputum, Expectorated Updated:  05/31/20 0243     Gram Stain (Respiratory) <10 epithelial cells per low power field.     Gram Stain (Respiratory) Rare WBC's     Gram Stain (Respiratory) Rare Gram positive cocci    Respiratory Viral Panel by PCR [facility]; Nasal Swab [[id number]] Collected:  05/29/20 0236    Order Status:  Sent Specimen:  Respiratory Updated:  05/29/20 0236

## 2020-05-31 NOTE — DISCHARGE SUMMARY
[facility]  Critical Care Medicine  Discharge Summary      Patient Name: Sandra Draper  MRN: [id number]  Admission Date: 5/28/2020  Hospital Length of Stay: 3 days  Discharge Date and Time:  05/31/2020 6:12 PM  Attending Physician: Yamil Bain MD   Discharging Provider: Tim Ferguson MD  Primary Care Provider: SAMI Mcpherson  Reason for Admission: Acute Liver Failure    HPI:   75 y/o with medical issues of CAD s/p PCI (2015), HFpEF, RA on DMARD(HCQ and MTX), DM, HTN, HLD< COPD, AAA, CKD3, Dementia presented with productive cough and hemoptysis.  Non-specific symptoms of feeling warm and cold, no reported urinary symptoms or bowel symptoms, no change in mental status from baseline.      Symptoms started 1 day before presentation to [facility], no abdominal pain reported to family, pt cannot give history is oriented to self and hospital, new abdominal tenderness and left sided decreased breath sounds noted on examination.       Patient was recently admitted on 5/17-5/19 with concerns of burning pain under breast, with chest pain and LUQ pain.  She was noted on repeat ECHO to have new worsening EF 40-45%, segmental wall motion abnormalities, reported to be euvolemic, cardiology consulted and did not recommend LHC, she was started on COREG & LOSARTAN.  Discharge summary indicates MTX was discontinued.      At the day of transfer to [facility] patient is found with Acute Liver Injury, transaminases >1000s, acute kidney injury, concern for sepsis with leukocytosis, tachycardia, and elevated lactic acid and new c/o of productive cough with blood tinged sputum.  Patient started on treatment for sepsis and Abdominal ultrasound comments on hypoechoic masses and that main portal vein thrombosed w/o any biliary ductal dilatation.      Discussed with Dr. Valera and she confirmed with pts daughter re:code status and pt is full code, will initiate for transfer and      ED Course - s/p 30cc/kg sepsis bolus, to  received vancomycin & levofloxacin, and Flagyl to be added.  Blood cultures drawn        * No surgery found *    Indwelling Lines/Drains at Time of Discharge:   Lines/Drains/Airways     Central Venous Catheter Line            Trialysis (Dialysis) Catheter 05/31/20 1114 right internal jugular less than 1 day          Drain                 Ureteral Drain/Stent 11/13/19 0940 Left ureter 6 Fr. 200 days         Urethral Catheter 05/29/20 0010 2 days         NG/OG Tube 05/31/20 1050 Boon sump 18 Fr. Center mouth less than 1 day          Arterial Line                 Arterial Line 05/31/20 1125 Right Radial less than 1 day              Hospital Course:   Patient transferred to [facility] from [facility] on 5/28 for evaluation of Acute Liver Injury w/ AST and ALT >2000s. Tylenol level mildly elevated (13). Given 2 doses of NAC prior to transfer. Received 3rd NAC in [facility] ED and was started on NAC infusion in MICU. Patient also found to have acute kidney injury (2.4, baseline 1.0), concern for sepsis with leukocytosis, tachycardia, elevated lactic acid (5.6), BNP >1000, Troponin elevated (0.2), INR 2.6 and new c/o of productive cough with blood tinged sputum. Patient w/ dementia at baseline, following commands, AAOx2 (self and city), noted to be at baseline per family.  Patient started on treatment for sepsis w/ bs abx (Vanc/Cefepime/Flagyl) and Abdominal ultrasound comments on hypoechoic masses and that main portal vein thrombosed w/o any biliary ductal dilatation. Repeat Liver US w/ doppler at [facility] ruled out portal vein thrombosis, but noted sluggish flow. Administer 5mg IV Vitamin K followed by 10mg IV vitamin K for the next 2 days. Hepatology consulted, appreciate recs. TTE significant for EF 60%, indeterminate DD, interatrial septal aneurysm present w/ bulging to left, RV dysfunction, PA sys 47mmHg, CVP 15mmHg. Trial of 80mg IV lasix x 1. Good response overnight w/ 2800cc uop. 2nd dose administered overnight 2/2 SOB w/  improvement of symptoms. (5/30) Worsening sCr (2.6), LFTs, INR (3.0), ammonia (78) and AMS (no longer oriented to self or city, but still following commands). Lactic Acid persistent (5.1). Remains acidotic (pH 7.289, CO2 29). CTH ordered - No acute intracranial abnormalities noting sequela of chronic microvascular ischemic change, senescent change, and suspected remote infarcts involving the bilateral centrum semiovale. Hold diuretics at this time 2/2 to worsening LFTs and renal function. CEA and  elevated at 64.3 and 157, respectively. (05/31) 10:50AM: Patient in Afib w/ RVR w/ HR 160s. /61. Good response to IV metoprolol yesterday. Team present in room. 5mg IV metoprolol administered. HR improved, however, patient became unresponsive. Coded in room. Epinephrine administered. One round of CPR w/ return of spontaneous circulation. Patient emergently intubated w/ RSI by Crit care fellow. Started on peripheral levophed gtt. Sedation w/ propofol gtt. Right IJ trialysis placed by Crirt care fellow. R radial arterial line placed by resident. Low MAPs. Increasing pressor requirements. Add vasopressin. Daughter, Megan, updated of patient's critical condition and has elected to continue all aggressive measures. Remains Full Code. 12:45PM: Critical Care Medicine called back to patient's bedside. VTach on monitor. Pulseless. CPR initiated. 2 rounds CPR. 2 Epinephrine. 3 Bicarb. 1 Amiodarone. Return of spontaneous circulation. Some blood noted in ETT 2/2 chest compressions. Daughter notified, wants patient to remain Full Code. Advised Megan to come to hospital to see patient and meet with CCM team for GOC discussion. Patient is critically ill with poor prognosis. 3:18PM: Patient's daughter, Megan, at bedside. After further discussion noting continued decline of patient on maximum support, she has decided to to make patient DNR. Will continue to support patient as best we can while other family members come to  visit. Plan to withdraw life support measures once family has visited. Called to bedside by patient's nurse. Nursing supervisor notified. Family at bedside.  has been called and is also at bedside. Patient is not responding to verbal or tactile stimuli. Patient does not have a papillary or corneal reflex. Her pupils are fixed and dilated. No heart or breath sounds on auscultation. No respirations. No palpable pulses. Time of death 16:45. Cause of Death: Cardiovascular collapse secondary to multi-system organ dysfunction and shock (septic vs cardiogenic).    Consults (From admission, onward)        Status Ordering Provider     Hepatology  Once     Provider:  (Not yet assigned)    Completed GAB ADDISON     Inpatient consult to Cardiology  Once     Provider:  (Not yet assigned)    Completed MEL SHARMA     Inpatient consult to Midline team  Once     Provider:  (Not yet assigned)    Completed BEENA BOBO     Inpatient consult to Nephrology  Once     Provider:  (Not yet assigned)    Completed MEL SHARMA        Review of Systems   Unable to perform ROS: Intubated     Physical Exam   Constitutional: She appears well-developed.   HENT:   Head: Normocephalic and atraumatic.   Eyes: No scleral icterus.   Neck: No JVD present.   Cardiovascular:   Asystole  No heart sounds   No palpable pulses   Pulmonary/Chest:   No breath sounds on auscultation. No respirations.   Abdominal: Soft.   Musculoskeletal: She exhibits no edema.   Neurological:   Patient is not responding to verbal or tactile stimuli. Patient does not have a papillary or corneal reflex. Her pupils are fixed and dilated.     Skin: Skin is dry.   Nursing note and vitals reviewed.      Significant Labs:  ABGs:   Recent Labs   Lab 05/31/20  1405   PH 7.135*   PCO2 29.7*   HCO3 10.0*   POCSATURATED 95   BE -19     Bilirubin:   Recent Labs   Lab 05/29/20  0210  05/30/20  1130 05/30/20  1510 05/30/20  2300 05/31/20  0630 05/31/20  0803   BILIDIR 1.0*   --   --   --   --   --   --    BILITOT 1.5*   < > 2.0* 1.9* 1.9* 2.1* 1.7*    < > = values in this interval not displayed.     CMP:   Recent Labs   Lab 05/30/20  2300 05/31/20  0630 05/31/20  0803 05/31/20  1158   * 136 132* 134*   K 3.6 4.4 4.4 4.4    105 110 105   CO2 13* 10* 10* 5*   GLU 86 74 239* 104   BUN 50* 53* 49* 53*   CREATININE 2.7* 2.9* 2.7* 3.1*   CALCIUM 8.4* 8.4* 7.8* 8.5*   PROT 5.4* 5.7* 5.3*  --    ALBUMIN 2.4* 2.5* 2.2*  --    BILITOT 1.9* 2.1* 1.7*  --    ALKPHOS 382* 385* 343*  --    AST 2,215* 2,139* 1,865*  --    ALT 1,659* 1,680* 1,498*  --    ANIONGAP 18* 21* 12 24*   EGFRNONAA 16.5* 15.1* 16.5* 14.0*     Coagulation:   Recent Labs   Lab 05/31/20  0803   INR 3.5*     Lactic Acid:   Recent Labs   Lab 05/31/20  0630 05/31/20  1013 05/31/20  1158   LACTATE 7.6* 7.7* >12.0*     All pertinent labs within the past 24 hours have been reviewed.    Significant Imaging:  I have reviewed all pertinent imaging results/findings within the past 24 hours.    Pending Diagnostic Studies:     Procedure Component Value Units Date/Time    CMV DNA, quantitative, PCR [[id number]] Collected:  05/28/20 2330    Order Status:  Sent Lab Status:  In process Updated:  05/29/20 0154    Specimen:  Blood     Cytomegalovirus (Cmv) Ab, Igm [[id number]] Collected:  05/28/20 2248    Order Status:  Sent Lab Status:  In process Updated:  05/29/20 0151    Specimen:  Blood     Jerzy-Barr Virus antibody panel [[id number]] Collected:  05/28/20 2248    Order Status:  Sent Lab Status:  In process Updated:  05/29/20 0151    Specimen:  Blood     Jerzy-Barr virus DNA, quantitative [[id number]] Collected:  05/28/20 2330    Order Status:  Sent Lab Status:  In process Updated:  05/29/20 0154    Specimen:  Blood     Herpes simplex Virus (HSV) Type 1 & 2 DNA by PCR [[id number]] Collected:  05/29/20 0855    Order Status:  Sent Lab Status:  In process Updated:  05/29/20 0959    Specimen:  Blood     Herpes simplex type 1 & 2  IgM,Herpes IgM [[id number]] Collected:  05/28/20 2248    Order Status:  Sent Lab Status:  In process Updated:  05/29/20 0151    Specimen:  Blood     Vancomycin, random [[id number]] Collected:  05/31/20 0245    Order Status:  Sent Lab Status:  No result     Specimen:  Blood         Final Active Diagnoses:    Diagnosis Date Noted POA    PRINCIPAL PROBLEM:  Acute liver failure [K72.00] 05/28/2020 Yes    Comfort measures only status [Z51.5] 05/31/2020 Not Applicable    Cardiovascular collapse [R57.0] 05/31/2020 No    HAP (hospital-acquired pneumonia) [J18.9, Y95] 05/29/2020 Yes    Chronic combined systolic and diastolic heart failure [I50.42] 05/29/2020 Yes    Acute liver disease [K76.9] 05/28/2020 Yes    Coronary artery disease involving native coronary artery of native heart without angina pectoris [I25.10] 05/20/2020 Yes    Severe episode of recurrent major depressive disorder, without psychotic features [F33.2] 01/08/2020 Yes    TRINITY (acute kidney injury) [N17.9] 11/13/2019 Yes    COPD (chronic obstructive pulmonary disease) [J44.9] 06/08/2019 Yes    Stable angina [I20.8] 03/09/2017 Yes    Essential hypertension [I10] 09/16/2015 Yes     Chronic    Rheumatoid arthritis involving both hips [M06.9] 01/29/2015 Yes    RA (rheumatoid arthritis) [M06.9] 10/06/2014 Yes     Chronic    HLD (hyperlipidemia) [E78.5] 10/06/2014 Yes    Diabetes mellitus type 2 without retinopathy [E11.9] 08/19/2014 Yes     Chronic    Long-term use of Plaquenil [Z79.899] 08/19/2014 Not Applicable    Productive cough [R05] 07/24/2014 Yes    GERD (gastroesophageal reflux disease) [K21.9] 06/30/2014 Yes     Chronic      Problems Resolved During this Admission:     Psychiatric  Severe episode of recurrent major depressive disorder, without psychotic features  Continue home medication: Sertaline    Pulmonary  HAP (hospital-acquired pneumonia)  Patient presented with productive cough and hemoptysis. COVID negative    Plan:  - Continue  Broad spectrum antibiotic coverage for patient with Vancomycin/Cefepime/metronidazole  - Fu/ cultures    COPD (chronic obstructive pulmonary disease)  Continue home medications: fluticasone furoate-vilanteroL     Duo neubs PRN    Productive cough  See HAP (Hospital Acquired Pneumonia)    Cardiac/Vascular  Cardiovascular collapse  Please see Hospital course for details.    Time of death 16:45    Cause of Death: Cardiovascular collapse secondary to multi-system organ dysfunction and shock (septic vs cardiogenic).    Chronic combined systolic and diastolic heart failure  Home medication: furosemide 20 mg OD daily  - euvolemic on exam    Plan:  - TTE (5/29): EF 60%, indeterminate DD, interatrial septal aneurysm present w/ bulging to left, PA sys 47mmHg, CVP 15mmHg  - Diuresed w/ 80mg IV lasix x 2 - worsening RFP and LFTs   - Hold further diuretics at this time    Coronary artery disease involving native coronary artery of native heart without angina pectoris  Continue home medication ASA    Stable angina  Continue home medication ASA    Essential hypertension  Home medications: Carvedilol and losartan    Anti-HTN were held due to borderline low BP   Will resume if needed    HLD (hyperlipidemia)  - Rosuvastatin held due to acute liver injury    Renal/  TRINITY (acute kidney injury)  Labs on presentation: Cr: 2.1 GFR: 22.4  Labs (5/19): Cr: 1.1 GFR: 48.9    Plan:  - Trial of 80mg IV lasix on 5/29 - good response  - 2nd dose of 80mg IV lasix late 5/29 as patient SOB, symptoms improved  - sCr worsening   - Hold diuretics at this time  - Urine electrolytes (Na <20, Urea:516 and Cr: 76), UAR   - Daily weight  - Strict intake/output  - Adjust medications to GFR  - Avoid nephrotoxic medications  - Maintain MAP > 65  - Maintain Hb > 7    Endocrine  Diabetes mellitus type 2 without retinopathy  Home medication review: no anti-DM listed    - HbA1c  - POCT glucose Q 6 hours  - LDISS for NPO patient   - Monitor glucose level and  adjust insulin accordingly    GI  * Acute liver failure  At the day of presentation to [facility] patient is found with Acute Liver Injury, transaminases >1000s, acute kidney injury, concern for sepsis with leukocytosis, tachycardia, and elevated lactic acid     Plan:  - Obtain Liver Doppler - Sluggish main portal vein velocity suggestive of portal hypertension.  No evidence of portal vein thrombosis.  - Trend CMP, Lactic Acid, CBC, Coags, check fibrinogen  - Persistent lactic acidosis 7.6  - Trend blood gas to monitor status of acidosis if bicarbonate is worse or change in respiratory status  - TTE: EF 60%, indeterminate DD, interatrial septal aneurysm present w/ bulging to left, PA sys 47mmHg, CVP 15mmHg  - Diuresed w/ 80mg IV lasix x 2 - worsening RFP and LFTs   - Hold further diuretics at this time  - Hepatology Consultation, appreciate recs  - Continue patient on NAC infusion protocol  - Recent hepatitis panel negative  - Check CARMELA, Anti-Smooth Muscl, Anti-mitochon ab, IgGlevels, discuss further serologies with hepatology  - Continue Broad spectrum antibiotic coverage for patient with Vancomycin/Cefepime/metronidazole  - Strict I/O  - Ammonia - 78  - Start lactulose if concern for worsening encephalopathy    Acute liver disease  Acute liver failure    GERD (gastroesophageal reflux disease)  Pantoprazole 40 mg IV daily    Orthopedic  Rheumatoid arthritis involving both hips  -- See RA    RA (rheumatoid arthritis)  On DMARDS (HCQ and MTX). Last discharge summary indicates MTX was discontinued.     Other  Long-term use of Plaquenil  -- See RA      Discharged Condition:     Disposition:       Patient Instructions:   No discharge procedures on file.  Medications:  None (patient  at medical facility)     Tim Ferguson MD  Critical Care Medicine  [facility]

## 2020-05-31 NOTE — ASSESSMENT & PLAN NOTE
Labs on presentation: Cr: 2.1 GFR: 22.4  Labs (5/19): Cr: 1.1 GFR: 48.9    Plan:  - Trial of 80mg IV lasix on 5/29 - good response  - 2nd dose of 80mg IV lasix late 5/29 as patient SOB, symptoms improved  - sCr worsening   - Hold diuretics at this time  - Urine electrolytes (Na <20, Urea:516 and Cr: 76), UAR   - Daily weight  - Strict intake/output  - Adjust medications to GFR  - Avoid nephrotoxic medications  - Maintain MAP > 65  - Maintain Hb > 7

## 2020-05-31 NOTE — CHAPLAIN
Spent 2 hrs off and on with fam and patient for comfort measure, w/d care and death; provided presence, prayer and support; completed my part of decedent ppwk; prayed with family. Lord in your mercy.

## 2020-05-31 NOTE — SIGNIFICANT EVENT
05/31/2020    10:50AM: Patient in Afib w/ RVR w/ HR 160s. /61. Good response to IV metoprolol yesterday. Team present in room. 5mg IV metoprolol administered. HR improved, however, patient became unresponsive. Coded in room. Epinephrine administered. One round of CPR w/ return of spontaneous circulation. Patient emergently intubated w/ RSI by Crit care fellow. Started on peripheral levophed gtt. Sedation w/ propofol gtt. Right IJ trialysis placed by Crirt care fellow. R radial arterial line placed by resident. Low MAPs. Increasing pressor requirements. Add vasopressin. Daughter, Megan, updated of patient's critical condition and has elected to continue all aggressive measures. Remains Full Code.     12:45PM: Critical Care Medicine called back to patient's bedside. VTach on monitor. Pulseless. CPR initiated. 2 rounds CPR. 2 Epinephrine. 3 Bicarb. 1 Amiodarone. Return of spontaneous circulation. Some blood noted in ETT 2/2 chest compressions. Daughter notified, wants patient to remain Full Code. Advised Megan to come to hospital to see patient and meet with CCM team for GOC discussion. Patient is critically ill with poor prognosis.    3:18PM: Patient's daughter, Megan, at bedside. After further discussion noting continued decline of patient on maximum support, she has decided to to make patient DNR. Will continue to support patient as best we can while other family members come to visit. Plan to withdraw life support measures once family has visited.    Tim Ferguson MD  Critical Care Medicine  PGY-1

## 2020-05-31 NOTE — CONSULTS
[facility]  Nephrology  Consult Note    Patient Name: Sandra Draper  MRN: [id number]  Admission Date: 5/28/2020  Hospital Length of Stay: 3 days  Attending Provider: Yamil Bain MD   Primary Care Physician: SAMI Mcpherson  Principal Problem:Acute liver failure    Inpatient consult to Nephrology  Consult performed by: Paola Cruz MD  Consult ordered by: Tim Ferguson MD        Subjective:     HPI: Sandra Draper is a 74 y.o. who was admitted to the ICU on 05/29 for ALI nephrology consulted for TRINITY. Patient has a PMH of of CAD s/p PCI (2015), HFpEF, RA on DMARD(HCQ and MTX), DM, HTN, HLD, COPD, AAA . She was admitted to [facility] for abdominal pain and was transferred here for hemoptysis and acute liver injury with transaminates  > 1000 . In [facility],she was found to be altered ( with unknown baseline), labs were notable for tylenol level of 13.5, INR 2.6 , cr 2.1.  She had an ultrasound that was suspicious for portal vein thrombosis, however repeat of ultrasound was negative. CT of abdomen comments on normal right kidney structure only     In the ICU she was diuresed initially with good urine output.Today noted with bicarb of 10m lactate of 6,  sCr  stable from yesterday 2.7  and Bun going down but largely stable 49 ,and sodium is improving . UA with 1+ protein moderate bacteria , RBC 9, leukocytes +1 , cultures so far negative , vitals notable for fluctuation hypotension with maps lowest 61 . She did not require any pressors or intubation.     On  Examination patient does not answer questions per nursing this is how she has been. CT head negative for intracranial process. On review of home medications patient was  on Augmentin, gabapentin and ibuprofen. She has had normal kidney function up to may 19th 2020.     Past Medical History:   Diagnosis Date    AAA (abdominal aortic aneurysm)     Acid reflux     Arthritis     Cataract     Chronic pain     COPD (chronic obstructive pulmonary  disease)     Coronary artery disease     stents    Diabetes mellitus     Diabetes mellitus, type 2     Fever blister     Hyperlipidemia     Hypertension     Kidney stone     Oxygen deficiency     uses O2 prn    Rheumatoid arteritis     UTI (urinary tract infection) 2018       Past Surgical History:   Procedure Laterality Date    CARDIAC SURGERY      CATARACT EXTRACTION      Bilateral    CHOLECYSTECTOMY      COLONOSCOPY      COLONOSCOPY N/A 5/23/2018    Procedure: COLONOSCOPY;  Surgeon: Thania Rodriguez MD;  [facility];  Service: Endoscopy;  Laterality: N/A;    CYSTOSCOPY W/ RETROGRADES Bilateral 10/11/2018    Procedure: CYSTOSCOPY WITH RETROGRADE PYELOGRAM;  Surgeon: Mike Pavon MD;  [facility];  Service: Urology;  Laterality: Bilateral;    CYSTOSCOPY W/ URETERAL STENT PLACEMENT Left 11/13/2019    Procedure: CYSTOSCOPY, WITH URETERAL STENT INSERTION;  Surgeon: Kody Lozano II, MD;  Location: Replaced by [facility];  Service: Urology;  Laterality: Left;    DILATION OF URETHRA N/A 10/11/2018    Procedure: DILATION-URETHRA;  Surgeon: Mike Pavon MD;  [facility];  Service: Urology;  Laterality: N/A;    ESOPHAGOGASTRODUODENOSCOPY      ESOPHAGOGASTRODUODENOSCOPY N/A 5/23/2018    Procedure: ESOPHAGOGASTRODUODENOSCOPY (EGD);  Surgeon: Thania Rodriguez MD;  Location: [facility];  Service: Endoscopy;  Laterality: N/A;    HYSTERECTOMY      KIDNEY STONE SURGERY      OOPHORECTOMY      PERICARDIOCENTESIS N/A 1/28/2020    Procedure: Pericardiocentesis;  Surgeon: Tommy Flannery MD;  Location: [facility] CATH LAB;  Service: Cardiovascular;  Laterality: N/A;    SPLENECTOMY, TOTAL         Review of patient's allergies indicates:   Allergen Reactions    Ace inhibitors Other (See Comments)    Amoxil [amoxicillin]      Does not remember what it did. Does not think it caused SOB.    Sulfa (sulfonamide antibiotics) Rash     Current Facility-Administered Medications   Medication  Frequency    acetylcysteine 20% (200 mg/ml) (ACETADOTE) 12,000 mg in dextrose 5 % 500 mL infusion Continuous    albuterol-ipratropium 2.5 mg-0.5 mg/3 mL nebulizer solution 3 mL Q4H PRN    ceFEPIme injection 2 g Q24H    dextrose 10% (D10W) Bolus PRN    ferrous sulfate EC tablet 325 mg Daily    fluticasone furoate-vilanteroL 100-25 mcg/dose diskus inhaler 1 puff Daily    folic acid tablet 1 mg Daily    glucagon (human recombinant) injection 1 mg PRN    hydrocortisone sodium succinate injection 100 mg Q8H    insulin aspart U-100 pen 0-5 Units Q6H PRN    lactulose 20 gram/30 mL solution Soln 10 g TID    lidocaine 5 % patch 1 patch Q24H    metoprolol injection 5 mg PRN    metronidazole IVPB 500 mg Q8H    ondansetron injection 4 mg Q8H PRN    pantoprazole injection 40 mg Daily    phytonadione vitamin k (AQUA-MEPHYTON) 10 mg in dextrose 5 % 50 mL IVPB Daily    promethazine tablet 25 mg Q6H PRN    sodium chloride 0.9% flush 10 mL PRN    vancomycin - pharmacy to dose pharmacy to manage frequency     Family History     Problem Relation (Age of Onset)    Alzheimer's disease Sister, Sister    Breast cancer Sister    Cancer Sister, Brother    Colon cancer Sister    Diabetes Mother, Father    Hypertension Mother, Father    Kidney disease Sister    Lung cancer Brother    Rheum arthritis Mother, Sister, Sister    Skin cancer Brother    Stroke Mother, Father        Tobacco Use    Smoking status: Former Smoker     Packs/day: 0.25     Years: 25.00     Pack years: 6.25     Types: Cigarettes     Start date: 10/29/1957     Last attempt to quit: 1997     Years since quittin.9    Smokeless tobacco: Never Used    Tobacco comment: ex > 20yrs   Substance and Sexual Activity    Alcohol use: No    Drug use: No    Sexual activity: Not Currently     Review of Systems   Unable to perform ROS: Mental status change   Constitutional: Positive for activity change.     Objective:     Vital Signs (Most Recent):  Temp:  96.7 °F (35.9 °C) (05/31/20 0700)  Pulse: 104 (05/31/20 0900)  Resp: (!) 29 (05/31/20 0900)  BP: 98/61 (05/31/20 0900)  SpO2: (!) 92 % (05/31/20 0900)  O2 Device (Oxygen Therapy): nasal cannula (05/31/20 0900) Vital Signs (24h Range):  Temp:  [96.7 °F (35.9 °C)-98.2 °F (36.8 °C)] 96.7 °F (35.9 °C)  Pulse:  [] 104  Resp:  [13-49] 29  SpO2:  [92 %-98 %] 92 %  BP: ()/(47-76) 98/61     Weight: 59 kg (130 lb) (05/29/20 0843)  Body mass index is 25.39 kg/m².  Body surface area is 1.58 meters squared.    I/O last 3 completed shifts:  In: 1425 [P.O.:50; I.V.:225; IV Piggyback:1150]  Out: 3305 [Urine:3305]    Physical Exam   Constitutional: She appears well-developed and well-nourished. She appears lethargic. No distress.   HENT:   Head: Normocephalic and atraumatic.   Eyes: Pupils are equal, round, and reactive to light. EOM are normal.   Neck: Normal range of motion. Neck supple. No hepatojugular reflux and no JVD present.   Cardiovascular: Regular rhythm, normal heart sounds and intact distal pulses. Tachycardia present.   No murmur heard.  Pulmonary/Chest: Effort normal. No respiratory distress. She has decreased breath sounds in the left lower field. She has no wheezes.   Abdominal: Soft. Bowel sounds are normal. There is no tenderness.   Musculoskeletal: Normal range of motion.   Neurological: She appears lethargic.   Does not respond to questions    Skin: Skin is warm and dry. She is not diaphoretic.   Nursing note and vitals reviewed.      Significant Labs:  CBC:   Recent Labs   Lab 05/31/20  0245   WBC 14.46*   RBC 3.55*   HGB 8.2*   HCT 25.4*   PLT 91*   MCV 72*   MCH 23.1*   MCHC 32.3     CMP:   Recent Labs   Lab 05/31/20  0630 05/31/20  0803   GLU 74 239*   CALCIUM 8.4* 7.8*   ALBUMIN 2.5* 2.2*   PROT 5.7* 5.3*    132*   K 4.4 4.4   CO2 10* 10*     --    BUN 53* 49*   CREATININE 2.9* 2.7*   ALKPHOS 385* 343*   ALT 1,680* 1,498*   AST 2,139* 1,865*   BILITOT 2.1* 1.7*     Recent Labs   Lab  05/30/20 2006   COLORU Yellow   SPECGRAV 1.010   PHUR 5.0   PROTEINUA 1+*   BACTERIA Moderate*   NITRITE Negative   LEUKOCYTESUR 1+*   HYALINECASTS 29*     All labs within the past 24 hours have been reviewed.    Significant Imaging:  Labs: Reviewed  X-Ray: Reviewed  US: Reviewed  CT: Reviewed    Assessment/Plan:     TRINITY (acute kidney injury)  Sandra Draper is a 74 y.o. who presents from OSH for acute liver injury found to have TRINITY with normal kidneys 5/19 and cr 1.1.  Urine microscopy performed today notable for multiple  acanthocytes, granular casts, WBC cast, urine on admits with prerenal etiology.  sCr today has been stable from yesterday and Bun going down but largely stable, Bicar is 10 in setting of elevated lactate and sodium is improving, Hgb on admission 10.2 and now 8.2 . MAR notable for recent use of Augmentin, ibuprofen     TRINITY likely secondary to multiple etiologies prerenal from volume losses, ATN from ischemia/sepsis, can not rule out AIN or GN given that the history is hard to get from patient.    Plan/ recommendations   Please obtain RF, C3/C4, pANCA, ANCA, AntiGBM, HIV,NPO, PR3 ,dsDNA, spep upep , light chains   Check protein creat ratio  Check renal/retroperitoneal US  Monitor Ins and Outs and daily weights.   Avoid nephrotoxic agents such as NSAIDS, Gadolinium and IV Radiocontrast  Renal Dose meds to current GFR/Creat Clereance.  MAPs> 65   Low threshold to dialyze,  Noted that was coded, please obtain another set of labs this afternoon around 4 pm and if her urine output decreases will dilayze her tonight, page fellow   Poor prognosis   If patient becomes hemodynamically stable could consider biopsy             Essential hypertension  - now on pressors     RA (rheumatoid arthritis)  - noted to be om MTX and prednisone   - Per primary     Diabetes mellitus type 2 without retinopathy  Per primary       Thank you for your consult. I will follow-up with patient. Please contact us if you have any  additional questions.    Further recommendations pending staff attestation    Paola Cruz MD  Nephrology  [facility]

## 2020-05-31 NOTE — HPI
Sandra Draper is a 74 y.o. who was admitted to the ICU on 05/29 for ALI nephrology consulted for TRINITY. Patient has a PMH of of CAD s/p PCI (2015), HFpEF, RA on DMARD(HCQ and MTX), DM, HTN, HLD, COPD, AAA . She was admitted to [facility] for abdominal pain and was transferred here for hemoptysis and acute liver injury with transaminates  > 1000 . In [facility],she was found to be altered ( with unknown baseline), labs were notable for tylenol level of 13.5, INR 2.6 , cr 2.1.  She had an ultrasound that was suspicious for portal vein thrombosis, however repeat of ultrasound was negative. CT of abdomen comments on normal right kidney structure only     In the ICU she was diuresed initially with good urine output.Today noted with bicarb of 10m lactate of 6,  sCr  stable from yesterday 2.7  and Bun going down but largely stable 49 ,and sodium is improving . UA with 1+ protein moderate bacteria , RBC 9, leukocytes +1 , cultures so far negative , vitals notable for fluctuation hypotension with maps lowest 61 . She did not require any pressors or intubation.     On  Examination patient does not answer questions per nursing this is how she has been. CT head negative for intracranial process. On review of home medications patient was  on Augmentin, gabapentin and ibuprofen. She has had normal kidney function up to may 19th 2020.

## 2020-05-31 NOTE — PROCEDURES
"Sandra Draper is a 74 y.o. female patient.    Temp: 96.7 °F (35.9 °C) (05/31/20 0700)  Pulse: (!) 116 (05/31/20 1100)  Resp: (!) 25 (05/31/20 1100)  BP: (!) 80/56 (05/31/20 1100)  SpO2: 97 % (05/31/20 1100)  Weight: 59 kg (130 lb) (05/29/20 0843)  Height: 5' (152.4 cm) (05/30/20 0715)       Central Line  Date/Time: 5/31/2020 11:35 AM  Location procedure was performed: [facility] CRITICAL CARE MEDICINE  Performed by: Mabel Arnold MD  Pre-operative Diagnosis: Hemodynamic instability  Post-operative diagnosis: Hemodynamic instability  Consent Done: Emergent Situation  Time out: Immediately prior to procedure a "time out" was called to verify the correct patient, procedure, equipment, support staff and site/side marked as required.  Indications: hemodialysis, vascular access and hemodynamic monitoring  Anesthesia: general anesthesia  Preparation: skin prepped with ChloraPrep  Skin prep agent dried: skin prep agent completely dried prior to procedure  Sterile barriers: all five maximum sterile barriers used - cap, mask, sterile gown, sterile gloves, and large sterile sheet  Hand hygiene: hand hygiene performed prior to central venous catheter insertion  Location details: right internal jugular  Catheter type: trialysis  Catheter size: 13 Fr  Catheter Length: 20cm    Ultrasound guidance: yes  Vessel Caliber: large, patentNeedle advanced into vessel with real time Ultrasound guidance.  Guidewire confirmed in vessel.  Sterile sheath used.  Number of attempts: 1  Assessment: placement verified by x-ray  Specimens: No  Implants: No  Post-procedure: line sutured,  chlorhexidine patch and sterile dressing applied  Complications: No      Mabel Arnold MD  [facility] Fellow  "

## 2020-05-31 NOTE — ASSESSMENT & PLAN NOTE
Sandra Draper is a 74 y.o. who presents from OSH for acute liver injury found to have TRINITY with normal kidneys 5/19 and cr 1.1.  Urine microscopy performed today notable for multiple  acanthocytes, granular casts, WBC cast, urine on admits with prerenal etiology.  sCr today has been stable from yesterday and Bun going down but largely stable, Bicar is 10 in setting of elevated lactate and sodium is improving, Hgb on admission 10.2 and now 8.2 . MAR notable for recent use of Augmentin, ibuprofen     TRINITY likely secondary to multiple etiologies prerenal from volume losses, ATN from ischemia/sepsis, can not rule out AIN or GN given that the history is hard to get from patient.    Plan/ recommendations   Please obtain RF, C3/C4, pANCA, ANCA, AntiGBM, HIV,NPO, PR3 ,dsDNA, spep upep , light chains   Check protein creat ratio  Check renal/retroperitoneal US  Monitor Ins and Outs and daily weights.   Avoid nephrotoxic agents such as NSAIDS, Gadolinium and IV Radiocontrast  Renal Dose meds to current GFR/Creat Clereance.  MAPs> 65   Low threshold to dialyze,  Noted that was coded, please obtain another set of labs this afternoon around 4 pm and if her urine output decreases will dilayze her tonight, page fellow   Poor prognosis

## 2020-05-31 NOTE — PROGRESS NOTES
TOD 1645, pt daughter and God-son, and  at bedside. Emotional support provided. All belongings sent home w/ daughter. Paperwork filled out and given to DOMINIQUE zayas/house supervisor notified - denied for organ donation d/t PMH.

## 2020-05-31 NOTE — EICU
Intervention Initiated From:  Bedside    Julio Communicated with Bedside Nurse regarding:  Time-Out    Nurse Notified:  Yes    Doctor Notified:  Yes    Comments: Privileges on Dr. Mabel Arnold verified for insertion of trialysis catheter. Privileges on  he will supervisie Dr. Tim Ferguson  1105 Right radial area cleansed with chlora prep per Dr. Ferguson. Sterile towels applied over site.  1107 Using US attempted 1st stick 20 angiocath, removed. Pressure held.  1107 Right side of neck cleansed with chlora prep large dressing applied  1112 Using US inserted needle. Checked with US. Glide wire inserted, removed needle Unable to local   1121 Dr. Wright assumed placement of right radial arterial line. Using US.  1113 Small punture at the opening. Dilator 1 inserted then removed. Dilator 2 inserted then removed.   1115 Inserted 20 [place] Trialysis catheter @ 17 cm. Glide wire removed from distal port. All ports aspirated then flushed.   1116 Suture down catheter. Biopatch at the distal end of catheter. Tegaderm applied. Patient tolerated well.   1116 Under US guidance inserting angiocatheter 20 gauze per Dr. Ferguson  1121 Dr. Wright assumed placement of right radial angiocatheter with new catheter. Under US guidance  1125 Angiocatheter placed. Good wave form.sutured down, then tegaderm applied. Biopatch on. Patient sedated tolerated well.

## 2020-05-31 NOTE — PROCEDURES
Sandra Draper is a 74 y.o. female patient.    Temp: 96.7 °F (35.9 °C) (05/31/20 0700)  Pulse: (!) 116 (05/31/20 1100)  Resp: (!) 25 (05/31/20 1100)  BP: (!) 80/56 (05/31/20 1100)  SpO2: 97 % (05/31/20 1100)  Weight: 59 kg (130 lb) (05/29/20 0843)  Height: 5' (152.4 cm) (05/30/20 0715)       Intubation  Date/Time: 5/31/2020 11:00 AM  Location procedure was performed: [facility] CRITICAL CARE MEDICINE  Performed by: Mabel Arnold MD  Authorized by: Mabel Arnold MD   Assisting provider: Yamil Bain MD  Pre-operative diagnosis: Respiratory Failure - Cardiac Arrest  Post-operative diagnosis: Respiratory Failure - Cardiac Arrest  Consent Done: Emergent Situation  Indications: respiratory failure  Intubation method: video-assisted  Patient status: unconscious  Preoxygenation: BVM  Sedatives: etomidate  Paralytic: rocuronium  Laryngoscope size: Glide 3  Tube size: 7.0 mm  Tube type: cuffed  Number of attempts: 1  Cricoid pressure: no  Cords visualized: yes  Post-procedure assessment: chest rise and ETCO2 monitor  Breath sounds: equal and absent over the epigastrium  Cuff inflated: yes  ETT to lip: 22 cm  Tube secured with: ETT carlson  Chest x-ray interpreted by me.  Chest x-ray findings: endotracheal tube in appropriate position  Patient tolerance: Patient tolerated the procedure well with no immediate complications  Complications: No  Specimens: No  Implants: No      Mabel Arnold MD  [employer] P[facility] Fellow

## 2020-05-31 NOTE — PROCEDURES
Sandra Draper is a 74 y.o. female patient.    Temp: 96.7 °F (35.9 °C) (05/31/20 0700)  Pulse: 105 (05/31/20 1230)  Resp: (!) 30 (05/31/20 1230)  BP: (!) 73/56 (05/31/20 1145)  SpO2: 99 % (05/31/20 1230)  Weight: 59 kg (130 lb) (05/29/20 0843)  Height: 5' (152.4 cm) (05/31/20 1141)       Arterial Line  Date/Time: 5/31/2020 1:03 PM  Location procedure was performed: [facility] CRITICAL CARE MEDICINE  Performed by: Tim Ferguson MD  Authorized by: Tim Ferguson MD   Assisting provider: Kailash Wright MD  Consent Done: Emergent Situation  Preparation: Patient was prepped and draped in the usual sterile fashion.  Indications: multiple ABGs, respiratory failure and hemodynamic monitoring  Location: right radial  Patient sedated: yes  Sedatives: propofol  Julio Cesar's test normal: yes  Needle gauge: 20  Number of attempts: 2  Complications: No  Estimated blood loss (mL): 5  Post-procedure: line sutured and dressing applied  Post-procedure CMS: unchanged  Patient tolerance: Patient tolerated the procedure well with no immediate complications  Comments: Successful placement of R radial arterial line          Tim Ferguson MD  Critical Care Medicine  5/31/2020

## 2020-06-01 LAB
ANTI SM ANTIBODY: 0.1 RATIO (ref 0–0.99)
ANTI SM/RNP ANTIBODY: 0.1 RATIO (ref 0–0.99)
ANTI-SM INTERPRETATION: NEGATIVE
ANTI-SM/RNP INTERPRETATION: NEGATIVE
ANTI-SSA ANTIBODY: 0.08 RATIO (ref 0–0.99)
ANTI-SSA INTERPRETATION: NEGATIVE
ANTI-SSB ANTIBODY: 0.08 RATIO (ref 0–0.99)
ANTI-SSB INTERPRETATION: NEGATIVE
CMV DNA SERPL NAA+PROBE-ACNC: NORMAL IU/ML
CMV IGM SERPL IA-ACNC: <8 AU/ML
DSDNA AB SER-ACNC: NORMAL [IU]/ML
EBV DNA BY PCR: <100 IU/ML
EBV EA IGG SER-ACNC: <5 U/ML
EBV NA IGG SER-ACNC: >600 U/ML
EBV VCA IGG SER-ACNC: >750 U/ML
EBV VCA IGM SER-ACNC: <10 U/ML

## 2020-06-01 NOTE — PLAN OF CARE
Patient  20 Time of death 16:45.Cause of Death: Cardiovascular collapse secondary to multi-system organ dysfunction and shock (septic vs cardiogenic) per Md Note.       20 0827   Final Note   Assessment Type Final Discharge Note   Anticipated Discharge Disposition    Hospital Follow Up  Appt(s) scheduled? No   Discharge plans and expectations educations in teach back method with documentation complete? No   Right Care Referral Info   Post Acute Recommendation No Care   Referral Type n/a        Koko Stanley RN MSN  Critical Care-   Ext. [phone]

## 2020-06-01 NOTE — PT/OT/SLP DISCHARGE
Occupational Therapy Discharge Summary    Sandra Draper  MRN: [id number]   Principal Problem: Acute liver failure      Patient Discharged from acute Occupational Therapy on 2020  .  Please refer to prior OT note dated 2020 for functional status.    Assessment:      Pt  2020    Objective:     GOALS:   Multidisciplinary Problems     Occupational Therapy Goals        Problem: Occupational Therapy Goal    Goal Priority Disciplines Outcome Interventions   Occupational Therapy Goal     OT, PT/OT Ongoing, Progressing    Description:  Goals to be met by: 7 days 2020     Patient will increase functional independence with ADLs by performing:    Pt to complete UE dressing with MIN A   Pt to complete seated g/h skills with set-up  Pt to complete t/f to commode with CGA  Pt to t/f to chair with CGA  Pt to tolerated OOB to chair x 3-4 hours daily to increase endurance for functional activity.                     Goals not achieved. Pt  2020  Reasons for Discontinuation of Therapy Services  Pt  2020      Plan:     Patient Discharged to: Pt  2020    BRET León  2020

## 2020-06-02 LAB
BACTERIA SPEC AEROBE CULT: ABNORMAL
BACTERIA SPEC AEROBE CULT: ABNORMAL
GRAM STN SPEC: ABNORMAL
HSV-1 DNA BY PCR: NEGATIVE
HSV-2 DNA BY PCR: NEGATIVE

## 2020-06-02 NOTE — PHYSICIAN QUERY
PT Name: Sandra Draper  MR #: [id number]    Physician Query Form - Respiratory Condition Clarification      CDS: Shawn Butterfield RN CCDS               Contact information: [email or web address]   This form is a permanent document in the medical record.    Query Date: June 2, 2020    By submitting this query, we are merely seeking further clarification of documentation. Please utilize your independent clinical judgment when addressing the question(s) below.    The Medical record contains the following   Indicators   Supporting Clinical Findings Location in Medical Record     x SOB, BOOKER, Wheezing, Productive Cough, Use of Accessory Muscles, etc. presented with productive cough and hemoptysis 5/29/2020 H&P Sulma Waldrop MD/Yamil Bain MD     x Acute/Chronic Illness HAP  Shock (septic vs cardiogenic)  Chronic combined systolic and diastolic heart failure  TRINITY  Acute liver failure  Sepsis  ILD- RA associated  Pulmonary HTN 5/31/2020 Discharge Summary Tim Ferguson MD/Yamil Bain MD          5/29/2020 H&P Sulma Waldrop MD/Yamil Bain MD     x Radiology Findings Stable cardiomegaly. Persistent airspace opacities left lung with greatest degree of airspace consolidation left perihilar and lower lung zones having somewhat masslike appearance in the left perihilar region.  Left sided effusion and/or pleural thickening. Focal airspace disease inferior right upper lobe laterally as previously seen. Diffuse prominence of underlying interstitial markings which may be on a chronic basis. 5/28/2020 Chest x-ray      x Respiratory Distress or Failure Respiratory Failure 5/31/2020 Procedure note Mabel Arnold MD/Yamil Bain MD    Hypoxia or Hypercapnia       x RR         ABGs         O2 sat RR 30, O2 sat of 67%     5/31/2020 11:53   POC PH 7.002 (LL)   POC PCO2 31.3 (L)   POC PO2 326 (H)   POC BE -23   POC HCO3 7.8 (L)   POC SATURATED O2 100   POC TCO2 9 (L)   Vt 450   PEEP 10   Sample ARTERIAL    5/31/2020  Vitals    ABGs     x BiPAP/Intubation Intubation 5/31/2020 Procedure note Mabel Arnold MD/Yamil Bain MD     x Supplemental O2 Mechanical Ventilation with FIO2 @ 100% 5/31/2020 Vitals    Home O2, Oxygen Dependence        Treatment      Other       Respiratory failure can be acute, chronic or both. It is generally further specified as hypoxic, hypercapnic or both. Lastly, it is important to identify an etiology, if known or suspected.   References::  https://www.acphospitalist.org/archives/2013/10/coding.htm http://YouMail/acute-respiratory-failure-know     The clinical guidelines noted below are only system guidelines, and do not replace the providers clinical judgment.    Provider, please specify diagnosis or diagnoses associated with above clinical findings.     Provider, please further specify the diagnosis of Respiratory Failure associated with above clinical findings.     [   xx] Acute Respiratory Failure with Hypoxia - ABG pO2 < 60 mmHg or O2 sat of 88% on RA and respiratory symptoms documented   [   ] Acute Respiratory Failure with Hypoxia and Hypercapnia - Hypoxia: ABG pO2 < 60 mmHg or O2 sat of 88% on RA and Hypercapnia: pCO2 > 50 mmHg with pH < 7.35 and respiratory symptoms documented   [   ] Other Respiratory Diagnosis (please specify): _________________________________   [   ]  Clinically Undetermined       Please document in your progress notes daily for the duration of treatment until resolved and include in your discharge summary.

## 2020-06-03 LAB
BACTERIA BLD CULT: NORMAL
BACTERIA BLD CULT: NORMAL
HSV AB, IGM BY EIA: 1.27 INDEX